# Patient Record
Sex: MALE | Race: WHITE | NOT HISPANIC OR LATINO | URBAN - METROPOLITAN AREA
[De-identification: names, ages, dates, MRNs, and addresses within clinical notes are randomized per-mention and may not be internally consistent; named-entity substitution may affect disease eponyms.]

---

## 2017-01-30 ENCOUNTER — INPATIENT (INPATIENT)
Facility: HOSPITAL | Age: 37
LOS: 6 days | Discharge: HOME | End: 2017-02-06
Attending: INTERNAL MEDICINE | Admitting: INTERNAL MEDICINE
Payer: COMMERCIAL

## 2017-01-30 VITALS
DIASTOLIC BLOOD PRESSURE: 72 MMHG | SYSTOLIC BLOOD PRESSURE: 109 MMHG | HEART RATE: 80 BPM | WEIGHT: 250 LBS | RESPIRATION RATE: 18 BRPM | OXYGEN SATURATION: 93 % | HEIGHT: 70 IN | TEMPERATURE: 98 F

## 2017-01-30 DIAGNOSIS — H59.022 CATARACT (LENS) FRAGMENTS IN EYE FOLLOWING CATARACT SURGERY, LEFT EYE: Chronic | ICD-10-CM

## 2017-01-30 DIAGNOSIS — I10 ESSENTIAL (PRIMARY) HYPERTENSION: ICD-10-CM

## 2017-01-30 DIAGNOSIS — N17.0 ACUTE KIDNEY FAILURE WITH TUBULAR NECROSIS: ICD-10-CM

## 2017-01-30 DIAGNOSIS — F10.10 ALCOHOL ABUSE, UNCOMPLICATED: ICD-10-CM

## 2017-01-30 DIAGNOSIS — M32.13 LUNG INVOLVEMENT IN SYSTEMIC LUPUS ERYTHEMATOSUS: ICD-10-CM

## 2017-01-30 DIAGNOSIS — S20.212A CONTUSION OF LEFT FRONT WALL OF THORAX, INITIAL ENCOUNTER: ICD-10-CM

## 2017-01-30 DIAGNOSIS — K57.92 DIVERTICULITIS OF INTESTINE, PART UNSPECIFIED, WITHOUT PERFORATION OR ABSCESS WITHOUT BLEEDING: ICD-10-CM

## 2017-01-30 DIAGNOSIS — R79.1 ABNORMAL COAGULATION PROFILE: ICD-10-CM

## 2017-01-30 DIAGNOSIS — Z72.0 TOBACCO USE: ICD-10-CM

## 2017-01-30 DIAGNOSIS — Z98.89 OTHER SPECIFIED POSTPROCEDURAL STATES: Chronic | ICD-10-CM

## 2017-01-30 DIAGNOSIS — I48.91 UNSPECIFIED ATRIAL FIBRILLATION: ICD-10-CM

## 2017-01-30 DIAGNOSIS — R55 SYNCOPE AND COLLAPSE: ICD-10-CM

## 2017-01-30 DIAGNOSIS — J44.9 CHRONIC OBSTRUCTIVE PULMONARY DISEASE, UNSPECIFIED: ICD-10-CM

## 2017-01-30 LAB
ALBUMIN SERPL ELPH-MCNC: 2.7 G/DL — LOW (ref 3.3–5)
ALP SERPL-CCNC: 100 U/L — SIGNIFICANT CHANGE UP (ref 40–120)
ALT FLD-CCNC: 58 U/L — SIGNIFICANT CHANGE UP (ref 12–78)
ANION GAP SERPL CALC-SCNC: 12 MMOL/L — SIGNIFICANT CHANGE UP (ref 5–17)
ANISOCYTOSIS BLD QL: SLIGHT — SIGNIFICANT CHANGE UP
APTT BLD: 50.2 SEC — HIGH (ref 27.5–37.4)
APTT BLD: 50.4 SEC — HIGH (ref 27.5–37.4)
AST SERPL-CCNC: 89 U/L — HIGH (ref 15–37)
BASOPHILS # BLD AUTO: 0 K/UL — SIGNIFICANT CHANGE UP (ref 0–0.2)
BASOPHILS NFR BLD AUTO: 0 % — SIGNIFICANT CHANGE UP (ref 0–2)
BILIRUB SERPL-MCNC: 0.6 MG/DL — SIGNIFICANT CHANGE UP (ref 0.2–1.2)
BUN SERPL-MCNC: 27 MG/DL — HIGH (ref 7–23)
CALCIUM SERPL-MCNC: 7.2 MG/DL — LOW (ref 8.5–10.1)
CHLORIDE SERPL-SCNC: 95 MMOL/L — LOW (ref 96–108)
CK MB BLD-MCNC: 0.6 % — SIGNIFICANT CHANGE UP (ref 0–3.5)
CK MB CFR SERPL CALC: 0.9 NG/ML — SIGNIFICANT CHANGE UP (ref 0.5–3.6)
CK SERPL-CCNC: 109 U/L — SIGNIFICANT CHANGE UP (ref 26–308)
CK SERPL-CCNC: 145 U/L — SIGNIFICANT CHANGE UP (ref 26–308)
CO2 SERPL-SCNC: 29 MMOL/L — SIGNIFICANT CHANGE UP (ref 22–31)
CREAT SERPL-MCNC: 2.08 MG/DL — HIGH (ref 0.5–1.3)
EOSINOPHIL # BLD AUTO: 0.1 K/UL — SIGNIFICANT CHANGE UP (ref 0–0.5)
EOSINOPHIL NFR BLD AUTO: 1 % — SIGNIFICANT CHANGE UP (ref 0–6)
GLUCOSE SERPL-MCNC: 82 MG/DL — SIGNIFICANT CHANGE UP (ref 70–99)
HCT VFR BLD CALC: 41 % — SIGNIFICANT CHANGE UP (ref 39–50)
HGB BLD-MCNC: 13.6 G/DL — SIGNIFICANT CHANGE UP (ref 13–17)
HYPOCHROMIA BLD QL: SLIGHT — SIGNIFICANT CHANGE UP
INR BLD: 1.04 RATIO — SIGNIFICANT CHANGE UP (ref 0.88–1.16)
LYMPHOCYTES # BLD AUTO: 0.6 K/UL — LOW (ref 1–3.3)
LYMPHOCYTES # BLD AUTO: 20 % — SIGNIFICANT CHANGE UP (ref 13–44)
MAGNESIUM SERPL-MCNC: 0.9 MG/DL — CRITICAL LOW (ref 1.8–2.4)
MCHC RBC-ENTMCNC: 27.5 PG — SIGNIFICANT CHANGE UP (ref 27–34)
MCHC RBC-ENTMCNC: 33.3 GM/DL — SIGNIFICANT CHANGE UP (ref 32–36)
MCV RBC AUTO: 82.6 FL — SIGNIFICANT CHANGE UP (ref 80–100)
MICROCYTES BLD QL: SLIGHT — SIGNIFICANT CHANGE UP
MONOCYTES # BLD AUTO: 0.6 K/UL — SIGNIFICANT CHANGE UP (ref 0–0.9)
MONOCYTES NFR BLD AUTO: 10 % — SIGNIFICANT CHANGE UP (ref 2–14)
NEUTROPHILS # BLD AUTO: 2 K/UL — SIGNIFICANT CHANGE UP (ref 1.8–7.4)
NEUTROPHILS NFR BLD AUTO: 69 % — SIGNIFICANT CHANGE UP (ref 43–77)
PHOSPHATE SERPL-MCNC: 4.3 MG/DL — SIGNIFICANT CHANGE UP (ref 2.5–4.5)
PLAT MORPH BLD: NORMAL — SIGNIFICANT CHANGE UP
PLATELET # BLD AUTO: 116 K/UL — LOW (ref 150–400)
POTASSIUM SERPL-MCNC: 3.2 MMOL/L — LOW (ref 3.5–5.3)
POTASSIUM SERPL-SCNC: 3.2 MMOL/L — LOW (ref 3.5–5.3)
PROT SERPL-MCNC: 7.5 GM/DL — SIGNIFICANT CHANGE UP (ref 6–8.3)
PROTHROM AB SERPL-ACNC: 11.6 SEC — SIGNIFICANT CHANGE UP (ref 10–13.1)
RBC # BLD: 4.96 M/UL — SIGNIFICANT CHANGE UP (ref 4.2–5.8)
RBC # FLD: 19.6 % — HIGH (ref 11–15)
RBC BLD AUTO: ABNORMAL
SODIUM SERPL-SCNC: 136 MMOL/L — SIGNIFICANT CHANGE UP (ref 135–145)
TROPONIN I SERPL-MCNC: 0.02 NG/ML — SIGNIFICANT CHANGE UP (ref 0.01–0.04)
TROPONIN I SERPL-MCNC: 0.03 NG/ML — SIGNIFICANT CHANGE UP (ref 0.01–0.04)
WBC # BLD: 3.3 K/UL — LOW (ref 3.8–10.5)
WBC # FLD AUTO: 3.3 K/UL — LOW (ref 3.8–10.5)

## 2017-01-30 PROCEDURE — 99223 1ST HOSP IP/OBS HIGH 75: CPT

## 2017-01-30 PROCEDURE — 99285 EMERGENCY DEPT VISIT HI MDM: CPT

## 2017-01-30 PROCEDURE — 70450 CT HEAD/BRAIN W/O DYE: CPT | Mod: 26

## 2017-01-30 PROCEDURE — 71250 CT THORAX DX C-: CPT | Mod: 26

## 2017-01-30 RX ORDER — TRAMADOL HYDROCHLORIDE 50 MG/1
50 TABLET ORAL EVERY 6 HOURS
Qty: 0 | Refills: 0 | Status: DISCONTINUED | OUTPATIENT
Start: 2017-01-30 | End: 2017-01-31

## 2017-01-30 RX ORDER — SODIUM CHLORIDE 9 MG/ML
3 INJECTION INTRAMUSCULAR; INTRAVENOUS; SUBCUTANEOUS EVERY 8 HOURS
Qty: 0 | Refills: 0 | Status: DISCONTINUED | OUTPATIENT
Start: 2017-01-30 | End: 2017-02-06

## 2017-01-30 RX ORDER — POTASSIUM CHLORIDE 20 MEQ
40 PACKET (EA) ORAL ONCE
Qty: 0 | Refills: 0 | Status: COMPLETED | OUTPATIENT
Start: 2017-01-30 | End: 2017-01-30

## 2017-01-30 RX ORDER — NICOTINE POLACRILEX 2 MG
1 GUM BUCCAL DAILY
Qty: 0 | Refills: 0 | Status: DISCONTINUED | OUTPATIENT
Start: 2017-01-30 | End: 2017-02-06

## 2017-01-30 RX ORDER — SODIUM CHLORIDE 9 MG/ML
3 INJECTION INTRAMUSCULAR; INTRAVENOUS; SUBCUTANEOUS ONCE
Qty: 0 | Refills: 0 | Status: COMPLETED | OUTPATIENT
Start: 2017-01-30 | End: 2017-01-30

## 2017-01-30 RX ORDER — MECLIZINE HCL 12.5 MG
25 TABLET ORAL ONCE
Qty: 0 | Refills: 0 | Status: DISCONTINUED | OUTPATIENT
Start: 2017-01-30 | End: 2017-01-30

## 2017-01-30 RX ORDER — SIMVASTATIN 20 MG/1
20 TABLET, FILM COATED ORAL AT BEDTIME
Qty: 0 | Refills: 0 | Status: DISCONTINUED | OUTPATIENT
Start: 2017-01-30 | End: 2017-02-06

## 2017-01-30 RX ORDER — MAGNESIUM OXIDE 400 MG ORAL TABLET 241.3 MG
400 TABLET ORAL DAILY
Qty: 0 | Refills: 0 | Status: DISCONTINUED | OUTPATIENT
Start: 2017-01-30 | End: 2017-02-06

## 2017-01-30 RX ORDER — CARVEDILOL PHOSPHATE 80 MG/1
25 CAPSULE, EXTENDED RELEASE ORAL EVERY 12 HOURS
Qty: 0 | Refills: 0 | Status: DISCONTINUED | OUTPATIENT
Start: 2017-01-30 | End: 2017-02-06

## 2017-01-30 RX ORDER — LISINOPRIL 2.5 MG/1
20 TABLET ORAL
Qty: 0 | Refills: 0 | Status: DISCONTINUED | OUTPATIENT
Start: 2017-01-30 | End: 2017-02-06

## 2017-01-30 RX ORDER — RAMIPRIL 5 MG
1 CAPSULE ORAL
Qty: 0 | Refills: 0 | COMMUNITY

## 2017-01-30 RX ORDER — AMLODIPINE BESYLATE 2.5 MG/1
5 TABLET ORAL
Qty: 0 | Refills: 0 | Status: DISCONTINUED | OUTPATIENT
Start: 2017-01-30 | End: 2017-02-06

## 2017-01-30 RX ORDER — SIMVASTATIN 20 MG/1
20 TABLET, FILM COATED ORAL AT BEDTIME
Qty: 0 | Refills: 0 | Status: DISCONTINUED | OUTPATIENT
Start: 2017-01-30 | End: 2017-01-30

## 2017-01-30 RX ORDER — SODIUM CHLORIDE 9 MG/ML
1000 INJECTION INTRAMUSCULAR; INTRAVENOUS; SUBCUTANEOUS
Qty: 0 | Refills: 0 | Status: DISCONTINUED | OUTPATIENT
Start: 2017-01-30 | End: 2017-02-02

## 2017-01-30 RX ORDER — HYDROXYCHLOROQUINE SULFATE 200 MG
200 TABLET ORAL
Qty: 0 | Refills: 0 | Status: DISCONTINUED | OUTPATIENT
Start: 2017-01-30 | End: 2017-02-06

## 2017-01-30 RX ORDER — MAGNESIUM SULFATE 500 MG/ML
2 VIAL (ML) INJECTION ONCE
Qty: 0 | Refills: 0 | Status: COMPLETED | OUTPATIENT
Start: 2017-01-30 | End: 2017-01-30

## 2017-01-30 RX ORDER — POTASSIUM CHLORIDE 20 MEQ
40 PACKET (EA) ORAL ONCE
Qty: 0 | Refills: 0 | Status: DISCONTINUED | OUTPATIENT
Start: 2017-01-30 | End: 2017-01-30

## 2017-01-30 RX ORDER — FERROUS SULFATE 325(65) MG
325 TABLET ORAL DAILY
Qty: 0 | Refills: 0 | Status: DISCONTINUED | OUTPATIENT
Start: 2017-01-30 | End: 2017-02-06

## 2017-01-30 RX ORDER — PANTOPRAZOLE SODIUM 20 MG/1
40 TABLET, DELAYED RELEASE ORAL
Qty: 0 | Refills: 0 | Status: DISCONTINUED | OUTPATIENT
Start: 2017-01-30 | End: 2017-02-06

## 2017-01-30 RX ORDER — POTASSIUM CHLORIDE 20 MEQ
20 PACKET (EA) ORAL ONCE
Qty: 0 | Refills: 0 | Status: COMPLETED | OUTPATIENT
Start: 2017-01-30 | End: 2017-01-30

## 2017-01-30 RX ORDER — TRAMADOL HYDROCHLORIDE 50 MG/1
50 TABLET ORAL ONCE
Qty: 0 | Refills: 0 | Status: DISCONTINUED | OUTPATIENT
Start: 2017-01-30 | End: 2017-01-30

## 2017-01-30 RX ADMIN — TRAMADOL HYDROCHLORIDE 50 MILLIGRAM(S): 50 TABLET ORAL at 23:16

## 2017-01-30 RX ADMIN — Medication 40 MILLIEQUIVALENT(S): at 19:20

## 2017-01-30 RX ADMIN — TRAMADOL HYDROCHLORIDE 50 MILLIGRAM(S): 50 TABLET ORAL at 23:30

## 2017-01-30 RX ADMIN — Medication 50 GRAM(S): at 21:48

## 2017-01-30 RX ADMIN — AMLODIPINE BESYLATE 5 MILLIGRAM(S): 2.5 TABLET ORAL at 22:07

## 2017-01-30 RX ADMIN — PANTOPRAZOLE SODIUM 40 MILLIGRAM(S): 20 TABLET, DELAYED RELEASE ORAL at 22:06

## 2017-01-30 RX ADMIN — Medication 20 MILLIEQUIVALENT(S): at 19:19

## 2017-01-30 RX ADMIN — SODIUM CHLORIDE 3 MILLILITER(S): 9 INJECTION INTRAMUSCULAR; INTRAVENOUS; SUBCUTANEOUS at 13:02

## 2017-01-30 RX ADMIN — Medication 2 MILLIGRAM(S): at 21:47

## 2017-01-30 RX ADMIN — SODIUM CHLORIDE 75 MILLILITER(S): 9 INJECTION INTRAMUSCULAR; INTRAVENOUS; SUBCUTANEOUS at 21:38

## 2017-01-30 RX ADMIN — CARVEDILOL PHOSPHATE 25 MILLIGRAM(S): 80 CAPSULE, EXTENDED RELEASE ORAL at 22:04

## 2017-01-30 RX ADMIN — Medication 200 MILLIGRAM(S): at 19:19

## 2017-01-30 RX ADMIN — TRAMADOL HYDROCHLORIDE 50 MILLIGRAM(S): 50 TABLET ORAL at 14:10

## 2017-01-30 RX ADMIN — SODIUM CHLORIDE 3 MILLILITER(S): 9 INJECTION INTRAMUSCULAR; INTRAVENOUS; SUBCUTANEOUS at 22:06

## 2017-01-30 RX ADMIN — SIMVASTATIN 20 MILLIGRAM(S): 20 TABLET, FILM COATED ORAL at 22:06

## 2017-01-30 RX ADMIN — TRAMADOL HYDROCHLORIDE 50 MILLIGRAM(S): 50 TABLET ORAL at 13:52

## 2017-01-30 NOTE — ED PROVIDER NOTE - PSH
Cataract (lens) fragments in eye following cataract surgery, left eye    History of throat surgery  stretched  History of throat surgery  cyst removed

## 2017-01-30 NOTE — H&P ADULT. - PROBLEM SELECTOR PLAN 9
contiue acei coreg and norvasc  low salt diet repeat labs  no lovenox for dvt ppx will use venodyne boots  pt is on ppi from home will continue

## 2017-01-30 NOTE — H&P ADULT. - PROBLEM SELECTOR PLAN 10
repeat labs  no lovenox for dvt ppx will use venodyne boots  pt is on ppi from home will continue creatinine > 2 previous <1   will rehydrate for etoh abuse and dehydration component monitor bun /cr consider renal eval /ultrasound kidney if no improvement

## 2017-01-30 NOTE — H&P ADULT. - PROBLEM SELECTOR PROBLEM 8
Diverticulitis of intestine without perforation or abscess without bleeding, unspecified part of intestinal tract Essential hypertension

## 2017-01-30 NOTE — H&P ADULT. - ASSESSMENT
25 yo  male etoh abuse has been drinking 1 quart vodka daily except for yesterday when he had 1 pint got oob to go to bathroom and was later found by his wife when she came  home from work lying on back with incontinence of stool no incontinence of urine . event was not witnessed and pt only remembers getting up . he experienced pain to left rib cage upon being awakened no pain to arms legs backside. no pmh of similar event ekg in er sinus rhythm slight ivcd qrs 10.5 otc ok . pt noted with cough white sputum , pt noted l flank pain upon awakening and has large hematoma to l flank ct scan shown no traumatic injury but pt has c/o pleuritic chest pain dyspnea

## 2017-01-30 NOTE — CONSULT NOTE ADULT - ASSESSMENT
awake alert speech fluent hx of etoh  copd gem  weakness legs ct head no acute  path    for mri brain tsh b12 eeg rpr  arm leg 4/5 r/o etoh with drawl seziure no tremors .

## 2017-01-30 NOTE — ED PROVIDER NOTE - PMH
A-fib    Diverticulitis    Enlarged heart    Heart valve problem  leaky  Kidney problem  born with 1 kidney, right side  Lupus    Platelets decreased

## 2017-01-30 NOTE — ED PROVIDER NOTE - CARE PLAN
Principal Discharge DX:	Syncope and collapse  Secondary Diagnosis:	Contusion of chest, left, initial encounter

## 2017-01-30 NOTE — ED ADULT TRIAGE NOTE - CHIEF COMPLAINT QUOTE
syncopal episode with c/o left rib s/p fall with pain upon inspiration , wife found patient on floor @ 6am

## 2017-01-30 NOTE — H&P ADULT. - HISTORY OF PRESENT ILLNESS
27 yo  male etoh abuse has been drinking 1 quart vodka daily except for yesterday when he had 1 pint got oob to go to bathroom and was later found by his wife when she came  home from work lying on back with incontinence of stool no incontinence of urine . event was not witnessed and pt only remembers getting up . he experienced pain to left rib cage upon being awakened no pain to arms legs backside. no pmh of similar event ekg in er sinus rhythm slight ivcd qrs 10.5 otc ok . pt noted with cough white sputum , pt noted l flank pain upon awakening and has large hematoma to l flank ct scan shown no traumatic injury but pt has c/o pleuritic chest pain dyspnea  pmh pf copd active smoker 1 ppd , htn, lupus , born with 1 kidney ,thrombocytopenia , afib  now in sinus, enlarged heart, diverticulosis , psh cataracts throat cystectomy , esophageal stricture dilation x 3 episodes   labs significant got ptt of 50 27 yo  male etoh abuse has been drinking 1 quart vodka daily except for yesterday when he had 1 pint got oob to go to bathroom and was later found by his wife when she came  home from work lying on back with incontinence of stool no incontinence of urine . event was not witnessed and pt only remembers getting up . he experienced pain to left rib cage upon being awakened no pain to arms legs backside. no pmh of similar event ekg in er sinus rhythm slight ivcd qrs 10.5 otc ok . pt noted with cough white sputum , pt noted l flank pain upon awakening and has large hematoma to l flank ct scan shown no traumatic injury but pt has c/o pleuritic chest pain dyspnea  pmh pf copd active smoker 1 ppd , htn, lupus , born with 1 kidney ,thrombocytopenia , afib  now in sinus, enlarged heart, diverticulosis , psh cataracts throat cystectomy , esophageal stricture dilation x 3 episodes   labs significant got ptt of 50 , k+ 3.2

## 2017-01-30 NOTE — H&P ADULT. - PROBLEM SELECTOR PLAN 8
not active issue contiue acei coreg and norvasc  low salt diet contiue acei coreg and norvasc  low salt diet  low potasium replete and monitor

## 2017-01-30 NOTE — H&P ADULT. - PROBLEM SELECTOR PLAN 1
telemetry monitoring  cardiology consult  tte  serial c/e  concerns for seizure possible from etho withdrawal dr arredondo consulted

## 2017-01-30 NOTE — H&P ADULT. - PMH
A-fib    Chronic obstructive pulmonary disease, unspecified COPD type    Diverticulitis    Enlarged heart    Heart valve problem  leaky  Kidney problem  born with 1 kidney, right side  Lupus    Platelets decreased    Tobacco abuse

## 2017-01-30 NOTE — ED ADULT NURSE NOTE - OBJECTIVE STATEMENT
syncopal episode with c/o left rib s/p fall with pain upon inspiration , wife found patient on floor @ 6am. He hit his left side on the dresser. He having pain with breathing and movement . He stopped the Coumadin  2 weeks ago taking for Atrial Fibrillation. He has a bruise to left side 8cm x 4cm with tenderness. Pt last alcohol drink 1 hour ago

## 2017-01-30 NOTE — H&P ADULT. - FAMILY HISTORY
Mother  Still living? Yes, Estimated age: Age Unknown  Family history of diabetes mellitus, Age at diagnosis: Age Unknown  Family history of sleep apnea, Age at diagnosis: Age Unknown

## 2017-01-31 DIAGNOSIS — I48.0 PAROXYSMAL ATRIAL FIBRILLATION: ICD-10-CM

## 2017-01-31 DIAGNOSIS — F10.230 ALCOHOL DEPENDENCE WITH WITHDRAWAL, UNCOMPLICATED: ICD-10-CM

## 2017-01-31 DIAGNOSIS — M32.8 OTHER FORMS OF SYSTEMIC LUPUS ERYTHEMATOSUS: ICD-10-CM

## 2017-01-31 DIAGNOSIS — E87.6 HYPOKALEMIA: ICD-10-CM

## 2017-01-31 DIAGNOSIS — E83.42 HYPOMAGNESEMIA: ICD-10-CM

## 2017-01-31 LAB
ANION GAP SERPL CALC-SCNC: 11 MMOL/L — SIGNIFICANT CHANGE UP (ref 5–17)
APTT BLD: 52 SEC — HIGH (ref 27.5–37.4)
BUN SERPL-MCNC: 29 MG/DL — HIGH (ref 7–23)
CALCIUM SERPL-MCNC: 7.4 MG/DL — LOW (ref 8.5–10.1)
CHLORIDE SERPL-SCNC: 94 MMOL/L — LOW (ref 96–108)
CHOLEST SERPL-MCNC: 134 MG/DL — SIGNIFICANT CHANGE UP (ref 10–199)
CK SERPL-CCNC: 98 U/L — SIGNIFICANT CHANGE UP (ref 26–308)
CO2 SERPL-SCNC: 30 MMOL/L — SIGNIFICANT CHANGE UP (ref 22–31)
CREAT SERPL-MCNC: 1.37 MG/DL — HIGH (ref 0.5–1.3)
GLUCOSE SERPL-MCNC: 79 MG/DL — SIGNIFICANT CHANGE UP (ref 70–99)
HCT VFR BLD CALC: 37.1 % — LOW (ref 39–50)
HCV AB S/CO SERPL IA: 0.17 S/CO — SIGNIFICANT CHANGE UP
HCV AB SERPL-IMP: SIGNIFICANT CHANGE UP
HDLC SERPL-MCNC: 70 MG/DL — SIGNIFICANT CHANGE UP (ref 40–125)
HGB BLD-MCNC: 12.3 G/DL — LOW (ref 13–17)
HIV 1+2 AB+HIV1 P24 AG SERPL QL IA: SIGNIFICANT CHANGE UP
LIPID PNL WITH DIRECT LDL SERPL: 50 MG/DL — SIGNIFICANT CHANGE UP
MAGNESIUM SERPL-MCNC: 1.3 MG/DL — LOW (ref 1.8–2.4)
MCHC RBC-ENTMCNC: 27.3 PG — SIGNIFICANT CHANGE UP (ref 27–34)
MCHC RBC-ENTMCNC: 33.1 GM/DL — SIGNIFICANT CHANGE UP (ref 32–36)
MCV RBC AUTO: 82.4 FL — SIGNIFICANT CHANGE UP (ref 80–100)
PLATELET # BLD AUTO: 109 K/UL — LOW (ref 150–400)
POTASSIUM SERPL-MCNC: 3.2 MMOL/L — LOW (ref 3.5–5.3)
POTASSIUM SERPL-SCNC: 3.2 MMOL/L — LOW (ref 3.5–5.3)
RBC # BLD: 4.5 M/UL — SIGNIFICANT CHANGE UP (ref 4.2–5.8)
RBC # FLD: 19.2 % — HIGH (ref 11–15)
SODIUM SERPL-SCNC: 135 MMOL/L — SIGNIFICANT CHANGE UP (ref 135–145)
TOTAL CHOLESTEROL/HDL RATIO MEASUREMENT: 1.9 RATIO — LOW (ref 3.4–9.6)
TRIGL SERPL-MCNC: 69 MG/DL — SIGNIFICANT CHANGE UP (ref 10–149)
TROPONIN I SERPL-MCNC: 0.02 NG/ML — SIGNIFICANT CHANGE UP (ref 0.01–0.04)
WBC # BLD: 3.4 K/UL — LOW (ref 3.8–10.5)
WBC # FLD AUTO: 3.4 K/UL — LOW (ref 3.8–10.5)

## 2017-01-31 PROCEDURE — 99233 SBSQ HOSP IP/OBS HIGH 50: CPT

## 2017-01-31 RX ORDER — POTASSIUM CHLORIDE 20 MEQ
20 PACKET (EA) ORAL
Qty: 0 | Refills: 0 | Status: COMPLETED | OUTPATIENT
Start: 2017-01-31 | End: 2017-01-31

## 2017-01-31 RX ORDER — MAGNESIUM SULFATE 500 MG/ML
2 VIAL (ML) INJECTION ONCE
Qty: 0 | Refills: 0 | Status: COMPLETED | OUTPATIENT
Start: 2017-01-31 | End: 2017-01-31

## 2017-01-31 RX ORDER — IPRATROPIUM/ALBUTEROL SULFATE 18-103MCG
3 AEROSOL WITH ADAPTER (GRAM) INHALATION ONCE
Qty: 0 | Refills: 0 | Status: COMPLETED | OUTPATIENT
Start: 2017-01-31 | End: 2017-01-31

## 2017-01-31 RX ORDER — ALBUTEROL 90 UG/1
2 AEROSOL, METERED ORAL EVERY 6 HOURS
Qty: 0 | Refills: 0 | Status: DISCONTINUED | OUTPATIENT
Start: 2017-01-31 | End: 2017-02-06

## 2017-01-31 RX ORDER — NICOTINE POLACRILEX 2 MG
4 GUM BUCCAL EVERY 4 HOURS
Qty: 0 | Refills: 0 | Status: DISCONTINUED | OUTPATIENT
Start: 2017-01-31 | End: 2017-02-06

## 2017-01-31 RX ADMIN — Medication 1.5 MILLIGRAM(S): at 18:14

## 2017-01-31 RX ADMIN — TRAMADOL HYDROCHLORIDE 50 MILLIGRAM(S): 50 TABLET ORAL at 23:34

## 2017-01-31 RX ADMIN — Medication 2 MILLIGRAM(S): at 21:19

## 2017-01-31 RX ADMIN — SODIUM CHLORIDE 3 MILLILITER(S): 9 INJECTION INTRAMUSCULAR; INTRAVENOUS; SUBCUTANEOUS at 05:51

## 2017-01-31 RX ADMIN — Medication 1 PATCH: at 12:45

## 2017-01-31 RX ADMIN — Medication 200 MILLIGRAM(S): at 18:13

## 2017-01-31 RX ADMIN — Medication 2 MILLIGRAM(S): at 14:41

## 2017-01-31 RX ADMIN — Medication 2 MILLIGRAM(S): at 02:59

## 2017-01-31 RX ADMIN — CARVEDILOL PHOSPHATE 25 MILLIGRAM(S): 80 CAPSULE, EXTENDED RELEASE ORAL at 05:50

## 2017-01-31 RX ADMIN — TRAMADOL HYDROCHLORIDE 50 MILLIGRAM(S): 50 TABLET ORAL at 13:39

## 2017-01-31 RX ADMIN — Medication 3 MILLILITER(S): at 05:50

## 2017-01-31 RX ADMIN — Medication 2 MILLIGRAM(S): at 11:16

## 2017-01-31 RX ADMIN — Medication 2 MILLIGRAM(S): at 19:16

## 2017-01-31 RX ADMIN — Medication 20 MILLIEQUIVALENT(S): at 12:46

## 2017-01-31 RX ADMIN — Medication 4 MILLIGRAM(S): at 20:05

## 2017-01-31 RX ADMIN — CARVEDILOL PHOSPHATE 25 MILLIGRAM(S): 80 CAPSULE, EXTENDED RELEASE ORAL at 18:13

## 2017-01-31 RX ADMIN — LISINOPRIL 20 MILLIGRAM(S): 2.5 TABLET ORAL at 05:50

## 2017-01-31 RX ADMIN — Medication 2 MILLIGRAM(S): at 16:03

## 2017-01-31 RX ADMIN — Medication 50 GRAM(S): at 13:31

## 2017-01-31 RX ADMIN — Medication 2 MILLIGRAM(S): at 20:10

## 2017-01-31 RX ADMIN — TRAMADOL HYDROCHLORIDE 50 MILLIGRAM(S): 50 TABLET ORAL at 22:34

## 2017-01-31 RX ADMIN — Medication 20 MILLIEQUIVALENT(S): at 14:41

## 2017-01-31 RX ADMIN — Medication 2 MILLIGRAM(S): at 05:49

## 2017-01-31 RX ADMIN — ALBUTEROL 2 PUFF(S): 90 AEROSOL, METERED ORAL at 12:46

## 2017-01-31 RX ADMIN — Medication 1.5 MILLIGRAM(S): at 22:27

## 2017-01-31 RX ADMIN — SODIUM CHLORIDE 75 MILLILITER(S): 9 INJECTION INTRAMUSCULAR; INTRAVENOUS; SUBCUTANEOUS at 22:15

## 2017-01-31 RX ADMIN — Medication 325 MILLIGRAM(S): at 13:45

## 2017-01-31 RX ADMIN — SIMVASTATIN 20 MILLIGRAM(S): 20 TABLET, FILM COATED ORAL at 22:14

## 2017-01-31 RX ADMIN — TRAMADOL HYDROCHLORIDE 50 MILLIGRAM(S): 50 TABLET ORAL at 14:40

## 2017-01-31 RX ADMIN — SODIUM CHLORIDE 3 MILLILITER(S): 9 INJECTION INTRAMUSCULAR; INTRAVENOUS; SUBCUTANEOUS at 21:42

## 2017-01-31 RX ADMIN — SODIUM CHLORIDE 3 MILLILITER(S): 9 INJECTION INTRAMUSCULAR; INTRAVENOUS; SUBCUTANEOUS at 14:41

## 2017-01-31 RX ADMIN — PANTOPRAZOLE SODIUM 40 MILLIGRAM(S): 20 TABLET, DELAYED RELEASE ORAL at 08:46

## 2017-01-31 RX ADMIN — Medication 200 MILLIGRAM(S): at 08:46

## 2017-01-31 RX ADMIN — Medication 20 MILLIEQUIVALENT(S): at 18:13

## 2017-01-31 RX ADMIN — AMLODIPINE BESYLATE 5 MILLIGRAM(S): 2.5 TABLET ORAL at 05:49

## 2017-01-31 NOTE — PROGRESS NOTE ADULT - SUBJECTIVE AND OBJECTIVE BOX
Patient is a 36y old  Male who presents with a chief complaint of passed out found on floor by wife (30 Jan 2017 17:30)      INTERVAL HPI/OVERNIGHT EVENTS:    MEDICATIONS  (STANDING):  sodium chloride 0.9% lock flush 3milliLiter(s) IV Push every 8 hours  LORazepam     Tablet 2milliGRAM(s) Oral every 4 hours  LORazepam     Tablet 1.5milliGRAM(s) Oral every 4 hours  sodium chloride 0.9%. 1000milliLiter(s) IV Continuous <Continuous>  LORazepam     Tablet milliGRAM(s) Oral   simvastatin 20milliGRAM(s) Oral at bedtime  hydroxychloroquine 200milliGRAM(s) Oral two times a day with meals  carvedilol 25milliGRAM(s) Oral every 12 hours  amLODIPine   Tablet 5milliGRAM(s) Oral two times a day  pantoprazole    Tablet 40milliGRAM(s) Oral before breakfast  lisinopril 20milliGRAM(s) Oral two times a day  ferrous    sulfate 325milliGRAM(s) Oral daily  magnesium oxide 400milliGRAM(s) Oral daily  nicotine -  14 mG/24Hr(s) Patch 1patch Transdermal daily  magnesium sulfate  IVPB 2Gram(s) IV Intermittent once  potassium chloride    Tablet ER 20milliEquivalent(s) Oral every 2 hours    MEDICATIONS  (PRN):  traMADol 50milliGRAM(s) Oral every 6 hours PRN Mild Pain (1 - 3)  ALBUTerol    90 MICROgram(s) HFA Inhaler 2Puff(s) Inhalation every 6 hours PRN Shortness of Breath and/or Wheezing      Allergies    sulfa drugs (Unknown)    Intolerances        REVIEW OF SYSTEMS:  CONSTITUTIONAL: No fever, weight loss, or fatigue  EYES: No eye pain, visual disturbances, or discharge  ENMT:  No difficulty hearing, tinnitus, vertigo; No sinus or throat pain  NECK: No pain or stiffness  BREASTS: No pain, masses, or nipple discharge  RESPIRATORY: No cough, wheezing, chills or hemoptysis; No shortness of breath  CARDIOVASCULAR: No chest pain, palpitations, dizziness, or leg swelling  GASTROINTESTINAL: No abdominal or epigastric pain. No nausea, vomiting, or hematemesis; No diarrhea or constipation. No melena or hematochezia.  GENITOURINARY: No dysuria, frequency, hematuria, or incontinence  NEUROLOGICAL: No headaches, memory loss, loss of strength, numbness, or tremors  SKIN: No itching, burning, rashes, or lesions   LYMPH NODES: No enlarged glands  ENDOCRINE: No heat or cold intolerance; No hair loss  MUSCULOSKELETAL: No joint pain or swelling; No muscle, back, or extremity pain  PSYCHIATRIC: No depression, anxiety, mood swings, or difficulty sleeping  HEME/LYMPH: No easy bruising, or bleeding gums  ALLERGY AND IMMUNOLOGIC: No hives or eczema    Vital Signs Last 24 Hrs  T(C): 36.9, Max: 36.9 (01-31 @ 05:28)  T(F): 98.4, Max: 98.4 (01-31 @ 05:28)  HR: 97 (80 - 105)  BP: 130/82 (107/61 - 151/91)  BP(mean): --  RR: 18 (16 - 20)  SpO2: 95% (92% - 97%)    PHYSICAL EXAM:  GENERAL: NAD, well-groomed, well-developed  HEAD:  Atraumatic, Normocephalic  EYES: EOMI, PERRLA, conjunctiva and sclera clear  ENMT: No tonsillar erythema, exudates, or enlargement; Moist mucous membranes, Good dentition, No lesions  NECK: Supple, No JVD, Normal thyroid  NERVOUS SYSTEM:  Alert & Oriented X3, Good concentration; Motor Strength 5/5 B/L upper and lower extremities; DTRs 2+ intact and symmetric  CHEST/LUNG: Clear to percussion bilaterally; No rales, rhonchi, wheezing, or rubs  HEART: Regular rate and rhythm; No murmurs, rubs, or gallops  ABDOMEN: Soft, Nontender, Nondistended; Bowel sounds present  EXTREMITIES:  2+ Peripheral Pulses, No clubbing, cyanosis, or edema  LYMPH: No lymphadenopathy noted  SKIN: No rashes or lesions    LABS:                        12.3   3.4   )-----------( 109      ( 31 Jan 2017 07:19 )             37.1     31 Jan 2017 07:19    135    |  94     |  29     ----------------------------<  79     3.2     |  30     |  1.37     Ca    7.4        31 Jan 2017 07:19  Phos  4.3       30 Jan 2017 18:29  Mg     1.3       31 Jan 2017 07:19    TPro  7.5    /  Alb  2.7    /  TBili  0.6    /  DBili  x      /  AST  89     /  ALT  58     /  AlkPhos  100    30 Jan 2017 13:13    PT/INR - ( 30 Jan 2017 13:13 )   PT: 11.6 sec;   INR: 1.04 ratio         PTT - ( 31 Jan 2017 07:19 )  PTT:52.0 sec    CAPILLARY BLOOD GLUCOSE      RADIOLOGY & ADDITIONAL TESTS:    Imaging Personally Reviewed:  [ ] YES  [ ] NO    Consultant(s) Notes Reviewed:  [ ] YES  [ ] NO    Care Discussed with Consultants/Other Providers [ ] YES  [ ] NO Patient is a 36y old  Male who presents with a chief complaint of passed out found on floor by wife (30 Jan 2017 17:30)      INTERVAL HPI/OVERNIGHT EVENTS:  notes right sided/abd back pain.  Feels slightly anxious.  some nausea.  no vomiting.  no fever or chills.  Mild tremor.  Wife at bedside.  patient states that he has tried to stop drinking in the past, the longest period of sobriety is 45 days last (september 2016).    MEDICATIONS  (STANDING):  sodium chloride 0.9% lock flush 3milliLiter(s) IV Push every 8 hours  LORazepam     Tablet 2milliGRAM(s) Oral every 4 hours  LORazepam     Tablet 1.5milliGRAM(s) Oral every 4 hours  sodium chloride 0.9%. 1000milliLiter(s) IV Continuous <Continuous>  LORazepam     Tablet milliGRAM(s) Oral   simvastatin 20milliGRAM(s) Oral at bedtime  hydroxychloroquine 200milliGRAM(s) Oral two times a day with meals  carvedilol 25milliGRAM(s) Oral every 12 hours  amLODIPine   Tablet 5milliGRAM(s) Oral two times a day  pantoprazole    Tablet 40milliGRAM(s) Oral before breakfast  lisinopril 20milliGRAM(s) Oral two times a day  ferrous    sulfate 325milliGRAM(s) Oral daily  magnesium oxide 400milliGRAM(s) Oral daily  nicotine -  14 mG/24Hr(s) Patch 1patch Transdermal daily  magnesium sulfate  IVPB 2Gram(s) IV Intermittent once  potassium chloride    Tablet ER 20milliEquivalent(s) Oral every 2 hours    MEDICATIONS  (PRN):  traMADol 50milliGRAM(s) Oral every 6 hours PRN Mild Pain (1 - 3)  ALBUTerol    90 MICROgram(s) HFA Inhaler 2Puff(s) Inhalation every 6 hours PRN Shortness of Breath and/or Wheezing      Allergies    sulfa drugs (Unknown)    Intolerances        REVIEW OF SYSTEMS:  CONSTITUTIONAL: No fever, weight loss, or fatigue  EYES: No eye pain, visual disturbances, or discharge  ENMT:  No difficulty hearing, tinnitus, vertigo; No sinus or throat pain  NECK: No pain or stiffness  BREASTS: No pain, masses, or nipple discharge  RESPIRATORY: No cough, wheezing, chills or hemoptysis; No shortness of breath  CARDIOVASCULAR: No chest pain, palpitations, dizziness, or leg swelling  GASTROINTESTINAL: No abdominal or epigastric pain. + nausea no vomiting or hematemesis; No diarrhea or constipation. No melena or hematochezia.  GENITOURINARY: No dysuria, frequency, hematuria, or incontinence  NEUROLOGICAL: No headaches, memory loss, loss of strength, numbness,+ mild tremors  SKIN: No itching, burning, rashes, or lesions   LYMPH NODES: No enlarged glands  ENDOCRINE: No heat or cold intolerance; No hair loss  MUSCULOSKELETAL: No joint pain or swelling; + muscle and back pain  PSYCHIATRIC: some anxiety and insomnia  HEME/LYMPH: No easy bruising, or bleeding gums  ALLERGY AND IMMUNOLOGIC: No hives or eczema    Vital Signs Last 24 Hrs  T(C): 36.9, Max: 36.9 (01-31 @ 05:28)  T(F): 98.4, Max: 98.4 (01-31 @ 05:28)  HR: 97 (80 - 105)  BP: 130/82 (107/61 - 151/91)  BP(mean): --  RR: 18 (16 - 20)  SpO2: 95% (92% - 97%)    PHYSICAL EXAM:  GENERAL: NAD, obese  HEAD:  Atraumatic, Normocephalic  EYES: EOMI, PERRLA, conjunctiva and sclera clear  ENMT: No tonsillar erythema, exudates, or enlargement; Moist mucous membranes, Good dentition, No lesions  NECK: Supple, No JVD  NERVOUS SYSTEM:  Alert & Oriented X3, Good concentration; Motor Strength 5/5 B/L upper and lower extremities; DTRs 2+ intact and symmetric.  Mild resting tremor bilaterally  CHEST/LUNG: Clear to auscultation bilaterally; No rales, rhonchi, wheezing, or rubs  HEART: Regular rate and rhythm; No murmurs, rubs, or gallops  ABDOMEN: Soft, obese nontender, Nondistended; Bowel sounds present  EXTREMITIES:  2+ Peripheral Pulses, No clubbing, cyanosis, or edema  LYMPH: No lymphadenopathy noted  SKIN: No rashes or lesions    LABS:                        12.3   3.4   )-----------( 109      ( 31 Jan 2017 07:19 )             37.1     31 Jan 2017 07:19    135    |  94     |  29     ----------------------------<  79     3.2     |  30     |  1.37     Ca    7.4        31 Jan 2017 07:19  Phos  4.3       30 Jan 2017 18:29  Mg     1.3       31 Jan 2017 07:19    TPro  7.5    /  Alb  2.7    /  TBili  0.6    /  DBili  x      /  AST  89     /  ALT  58     /  AlkPhos  100    30 Jan 2017 13:13    PT/INR - ( 30 Jan 2017 13:13 )   PT: 11.6 sec;   INR: 1.04 ratio         PTT - ( 31 Jan 2017 07:19 )  PTT:52.0 sec    CAPILLARY BLOOD GLUCOSE      RADIOLOGY & ADDITIONAL TESTS:    Imaging Personally Reviewed:  [x ] YES  [ ] NO    Consultant(s) Notes Reviewed:  [x ] YES  [ ] NO    Care Discussed with Consultants/Other Providers [x ] YES  [ ] NO

## 2017-02-01 LAB
ANION GAP SERPL CALC-SCNC: 6 MMOL/L — SIGNIFICANT CHANGE UP (ref 5–17)
BASOPHILS # BLD AUTO: 0 K/UL — SIGNIFICANT CHANGE UP (ref 0–0.2)
BASOPHILS NFR BLD AUTO: 0.8 % — SIGNIFICANT CHANGE UP (ref 0–2)
BUN SERPL-MCNC: 23 MG/DL — SIGNIFICANT CHANGE UP (ref 7–23)
CALCIUM SERPL-MCNC: 7.6 MG/DL — LOW (ref 8.5–10.1)
CHLORIDE SERPL-SCNC: 99 MMOL/L — SIGNIFICANT CHANGE UP (ref 96–108)
CO2 SERPL-SCNC: 33 MMOL/L — HIGH (ref 22–31)
CREAT SERPL-MCNC: 1.13 MG/DL — SIGNIFICANT CHANGE UP (ref 0.5–1.3)
EOSINOPHIL # BLD AUTO: 0.1 K/UL — SIGNIFICANT CHANGE UP (ref 0–0.5)
EOSINOPHIL NFR BLD AUTO: 2.9 % — SIGNIFICANT CHANGE UP (ref 0–6)
GLUCOSE SERPL-MCNC: 84 MG/DL — SIGNIFICANT CHANGE UP (ref 70–99)
HCT VFR BLD CALC: 32.7 % — LOW (ref 39–50)
HGB BLD-MCNC: 10.7 G/DL — LOW (ref 13–17)
LYMPHOCYTES # BLD AUTO: 0.5 K/UL — LOW (ref 1–3.3)
LYMPHOCYTES # BLD AUTO: 17.2 % — SIGNIFICANT CHANGE UP (ref 13–44)
MAGNESIUM SERPL-MCNC: 1.5 MG/DL — LOW (ref 1.8–2.4)
MCHC RBC-ENTMCNC: 27 PG — SIGNIFICANT CHANGE UP (ref 27–34)
MCHC RBC-ENTMCNC: 32.8 GM/DL — SIGNIFICANT CHANGE UP (ref 32–36)
MCV RBC AUTO: 82.2 FL — SIGNIFICANT CHANGE UP (ref 80–100)
MONOCYTES # BLD AUTO: 0.6 K/UL — SIGNIFICANT CHANGE UP (ref 0–0.9)
MONOCYTES NFR BLD AUTO: 22.9 % — HIGH (ref 2–14)
NEUTROPHILS # BLD AUTO: 1.5 K/UL — LOW (ref 1.8–7.4)
NEUTROPHILS NFR BLD AUTO: 56.1 % — SIGNIFICANT CHANGE UP (ref 43–77)
PHOSPHATE SERPL-MCNC: 2.6 MG/DL — SIGNIFICANT CHANGE UP (ref 2.5–4.5)
PLATELET # BLD AUTO: 104 K/UL — LOW (ref 150–400)
POTASSIUM SERPL-MCNC: 3.1 MMOL/L — LOW (ref 3.5–5.3)
POTASSIUM SERPL-SCNC: 3.1 MMOL/L — LOW (ref 3.5–5.3)
RBC # BLD: 3.97 M/UL — LOW (ref 4.2–5.8)
RBC # FLD: 19.1 % — HIGH (ref 11–15)
SODIUM SERPL-SCNC: 138 MMOL/L — SIGNIFICANT CHANGE UP (ref 135–145)
WBC # BLD: 2.6 K/UL — LOW (ref 3.8–10.5)
WBC # FLD AUTO: 2.6 K/UL — LOW (ref 3.8–10.5)

## 2017-02-01 PROCEDURE — 99233 SBSQ HOSP IP/OBS HIGH 50: CPT

## 2017-02-01 RX ORDER — POTASSIUM CHLORIDE 20 MEQ
40 PACKET (EA) ORAL EVERY 4 HOURS
Qty: 0 | Refills: 0 | Status: COMPLETED | OUTPATIENT
Start: 2017-02-01 | End: 2017-02-01

## 2017-02-01 RX ADMIN — Medication 1 PATCH: at 12:09

## 2017-02-01 RX ADMIN — Medication 2 MILLIGRAM(S): at 17:05

## 2017-02-01 RX ADMIN — Medication 1 MILLIGRAM(S): at 22:49

## 2017-02-01 RX ADMIN — Medication 2 MILLIGRAM(S): at 20:40

## 2017-02-01 RX ADMIN — CARVEDILOL PHOSPHATE 25 MILLIGRAM(S): 80 CAPSULE, EXTENDED RELEASE ORAL at 17:05

## 2017-02-01 RX ADMIN — SODIUM CHLORIDE 3 MILLILITER(S): 9 INJECTION INTRAMUSCULAR; INTRAVENOUS; SUBCUTANEOUS at 06:31

## 2017-02-01 RX ADMIN — MAGNESIUM OXIDE 400 MG ORAL TABLET 400 MILLIGRAM(S): 241.3 TABLET ORAL at 12:09

## 2017-02-01 RX ADMIN — SODIUM CHLORIDE 3 MILLILITER(S): 9 INJECTION INTRAMUSCULAR; INTRAVENOUS; SUBCUTANEOUS at 13:20

## 2017-02-01 RX ADMIN — Medication 2 MILLIGRAM(S): at 21:41

## 2017-02-01 RX ADMIN — Medication 2 MILLIGRAM(S): at 07:25

## 2017-02-01 RX ADMIN — Medication 1.5 MILLIGRAM(S): at 06:56

## 2017-02-01 RX ADMIN — Medication 2 MILLIGRAM(S): at 14:47

## 2017-02-01 RX ADMIN — PANTOPRAZOLE SODIUM 40 MILLIGRAM(S): 20 TABLET, DELAYED RELEASE ORAL at 06:57

## 2017-02-01 RX ADMIN — Medication 2 MILLIGRAM(S): at 19:00

## 2017-02-01 RX ADMIN — Medication 2 MILLIGRAM(S): at 03:18

## 2017-02-01 RX ADMIN — ALBUTEROL 2 PUFF(S): 90 AEROSOL, METERED ORAL at 17:05

## 2017-02-01 RX ADMIN — Medication 200 MILLIGRAM(S): at 09:38

## 2017-02-01 RX ADMIN — Medication 2 MILLIGRAM(S): at 17:00

## 2017-02-01 RX ADMIN — Medication 2 MILLIGRAM(S): at 00:30

## 2017-02-01 RX ADMIN — CARVEDILOL PHOSPHATE 25 MILLIGRAM(S): 80 CAPSULE, EXTENDED RELEASE ORAL at 06:56

## 2017-02-01 RX ADMIN — SODIUM CHLORIDE 3 MILLILITER(S): 9 INJECTION INTRAMUSCULAR; INTRAVENOUS; SUBCUTANEOUS at 22:28

## 2017-02-01 RX ADMIN — Medication 40 MILLIEQUIVALENT(S): at 09:39

## 2017-02-01 RX ADMIN — Medication 2 MILLIGRAM(S): at 16:23

## 2017-02-01 RX ADMIN — Medication 2 MILLIGRAM(S): at 13:21

## 2017-02-01 RX ADMIN — Medication 40 MILLIEQUIVALENT(S): at 09:38

## 2017-02-01 RX ADMIN — Medication 2 MILLIGRAM(S): at 09:45

## 2017-02-01 RX ADMIN — Medication 4 MILLIGRAM(S): at 07:27

## 2017-02-01 RX ADMIN — LISINOPRIL 20 MILLIGRAM(S): 2.5 TABLET ORAL at 06:56

## 2017-02-01 RX ADMIN — Medication 200 MILLIGRAM(S): at 17:59

## 2017-02-01 RX ADMIN — AMLODIPINE BESYLATE 5 MILLIGRAM(S): 2.5 TABLET ORAL at 06:57

## 2017-02-01 RX ADMIN — Medication 325 MILLIGRAM(S): at 12:09

## 2017-02-01 RX ADMIN — Medication 2 MILLIGRAM(S): at 22:41

## 2017-02-01 NOTE — PROGRESS NOTE ADULT - ATTENDING COMMENTS
plan of care discussed with patient, at this point patient does not have capacity to sign out against medical advice, continue 1:1 observation for patient safety

## 2017-02-01 NOTE — CHART NOTE - NSCHARTNOTEFT_GEN_A_CORE
Hospitalist-Medicine NP    CC: "I want to go out to smoke"    Requested by RN to evaluate pt, as per RN he is agitated, requesting to leave to smoke. Wife at bedside    VSS    P.E:   Heart: S1 S2 RRR  Lungs: CTAB  Neuro: Alert, and agitated at this time.   CIWAA as per RN 22    A/P: 36 year old admitted with ETOH withdrawal seen for agitation  Pt pulled out IV hep lock   Stat dose of Ativan 2 mg IM given  continue current care  continue tele monitor  ICU consult  Placed a call out to Dr. Suarez  Will continue to monitor Hospitalist-Medicine NP    CC: "I want to go out to smoke"    Requested by RN to evaluate pt, as per RN he is agitated, requesting to leave to smoke. Wife at bedside    VSS    P.E:   Heart: S1 S2 RRR  Lungs: CTAB  Neuro: Alert, and agitated at this time.   CIWAA as per RN 22 but now CIWAA now is 5    A/P: 36 year old admitted with ETOH withdrawal seen for agitation  Pt pulled out IV hep lock   Stat dose of Ativan 2 mg IM given  continue current care  continue tele monitor  Placed a call out to Dr. Suarez  Will continue to monitor

## 2017-02-01 NOTE — PROGRESS NOTE ADULT - SUBJECTIVE AND OBJECTIVE BOX
Patient is a 36y old  Male who presents with a chief complaint of passed out found on floor by wife (30 Jan 2017 17:30)      INTERVAL HPI/OVERNIGHT EVENTS:  patient increasingly agitated and disoriented overnight.  currently alert, and somewhat disoriented.  No nausea or vomiting.    MEDICATIONS  (STANDING):  sodium chloride 0.9% lock flush 3milliLiter(s) IV Push every 8 hours  LORazepam     Tablet 1milliGRAM(s) Oral every 4 hours  sodium chloride 0.9%. 1000milliLiter(s) IV Continuous <Continuous>  LORazepam     Tablet milliGRAM(s) Oral   simvastatin 20milliGRAM(s) Oral at bedtime  hydroxychloroquine 200milliGRAM(s) Oral two times a day with meals  carvedilol 25milliGRAM(s) Oral every 12 hours  amLODIPine   Tablet 5milliGRAM(s) Oral two times a day  pantoprazole    Tablet 40milliGRAM(s) Oral before breakfast  lisinopril 20milliGRAM(s) Oral two times a day  ferrous    sulfate 325milliGRAM(s) Oral daily  magnesium oxide 400milliGRAM(s) Oral daily  nicotine -  14 mG/24Hr(s) Patch 1patch Transdermal daily    MEDICATIONS  (PRN):  ALBUTerol    90 MICROgram(s) HFA Inhaler 2Puff(s) Inhalation every 6 hours PRN Shortness of Breath and/or Wheezing  LORazepam     Tablet 2milliGRAM(s) Oral every 2 hours PRN Symptom-triggered 2 point increase in CIWA-Ar  LORazepam   Injectable 2milliGRAM(s) IV Push every 1 hour PRN Symptom-triggered: each CIWA -Ar score 8 or GREATER  nicotine  Polacrilex Gum 4milliGRAM(s) Oral every 4 hours PRN cravings      Allergies    sulfa drugs (Unknown)    Intolerances        REVIEW OF SYSTEMS:  CONSTITUTIONAL: No fever, weight loss, or fatigue  EYES: No eye pain, visual disturbances, or discharge  ENMT:  No difficulty hearing, tinnitus, vertigo; No sinus or throat pain  NECK: No pain or stiffness  BREASTS: No pain, masses, or nipple discharge  RESPIRATORY: No cough, wheezing, chills or hemoptysis; No shortness of breath  CARDIOVASCULAR: No chest pain, palpitations, dizziness, or leg swelling  GASTROINTESTINAL: No abdominal or epigastric pain. No nausea, vomiting, or hematemesis  GENITOURINARY: No dysuria, frequency, hematuria, or incontinence  NEUROLOGICAL: + headaches,+ confusion, mild tremor  SKIN: No itching, burning, rashes, or lesions   LYMPH NODES: No enlarged glands  ENDOCRINE: No heat or cold intolerance; No hair loss  MUSCULOSKELETAL: No joint pain or swelling; No muscle, back, or extremity pain  PSYCHIATRIC: + anxiety and labile mood  HEME/LYMPH: No easy bruising, or bleeding gums  ALLERGY AND IMMUNOLOGIC: No hives or eczema    Vital Signs Last 24 Hrs  T(C): 36.7, Max: 37.7 (02-01 @ 05:00)  T(F): 98.1, Max: 99.8 (02-01 @ 05:00)  HR: 98 (95 - 105)  BP: 113/78 (102/64 - 120/69)  BP(mean): --  RR: 18 (17 - 18)  SpO2: 94% (94% - 98%)    PHYSICAL EXAM:  GENERAL: non toxic, mildly agitated  HEAD:  Atraumatic, Normocephalic  EYES: EOMI, PERRLA, conjunctiva and sclera clear  ENMT: No tonsillar erythema, exudates, or enlargement; Moist mucous membranes  NECK: Supple, No JVD  NERVOUS SYSTEM:  Alert & Oriented X2, Motor Strength 5/5 B/L upper and lower extremities  CHEST/LUNG: Clear to auscultation; No rales, rhonchi, wheezing, or rubs  HEART: sinus tachycardia +S1S2  ABDOMEN: Soft, Nontender, Nondistended obese  EXTREMITIES:  2+ Peripheral Pulses, No clubbing, cyanosis, or edema  LYMPH: No lymphadenopathy noted  SKIN: No rashes or lesions    LABS:                        10.7   2.6   )-----------( 104      ( 01 Feb 2017 06:15 )             32.7     01 Feb 2017 06:15    138    |  99     |  23     ----------------------------<  84     3.1     |  33     |  1.13     Ca    7.6        01 Feb 2017 06:15  Phos  2.6       01 Feb 2017 06:15  Mg     1.5       01 Feb 2017 06:15      PTT - ( 31 Jan 2017 07:19 )  PTT:52.0 sec    CAPILLARY BLOOD GLUCOSE      RADIOLOGY & ADDITIONAL TESTS:    Imaging Personally Reviewed:  [x ] YES  [ ] NO    Consultant(s) Notes Reviewed:  [x ] YES  [ ] NO    Care Discussed with Consultants/Other Providers [x ] YES  [ ] NO

## 2017-02-02 LAB
ANION GAP SERPL CALC-SCNC: 7 MMOL/L — SIGNIFICANT CHANGE UP (ref 5–17)
BASOPHILS # BLD AUTO: 0 K/UL — SIGNIFICANT CHANGE UP (ref 0–0.2)
BASOPHILS NFR BLD AUTO: 0.5 % — SIGNIFICANT CHANGE UP (ref 0–2)
BUN SERPL-MCNC: 13 MG/DL — SIGNIFICANT CHANGE UP (ref 7–23)
CALCIUM SERPL-MCNC: 7.8 MG/DL — LOW (ref 8.5–10.1)
CHLORIDE SERPL-SCNC: 102 MMOL/L — SIGNIFICANT CHANGE UP (ref 96–108)
CO2 SERPL-SCNC: 32 MMOL/L — HIGH (ref 22–31)
CREAT SERPL-MCNC: 1.02 MG/DL — SIGNIFICANT CHANGE UP (ref 0.5–1.3)
EOSINOPHIL # BLD AUTO: 0 K/UL — SIGNIFICANT CHANGE UP (ref 0–0.5)
EOSINOPHIL NFR BLD AUTO: 1.7 % — SIGNIFICANT CHANGE UP (ref 0–6)
GLUCOSE SERPL-MCNC: 80 MG/DL — SIGNIFICANT CHANGE UP (ref 70–99)
HCT VFR BLD CALC: 34 % — LOW (ref 39–50)
HGB BLD-MCNC: 11.1 G/DL — LOW (ref 13–17)
LYMPHOCYTES # BLD AUTO: 0.5 K/UL — LOW (ref 1–3.3)
LYMPHOCYTES # BLD AUTO: 21.2 % — SIGNIFICANT CHANGE UP (ref 13–44)
MAGNESIUM SERPL-MCNC: 1.3 MG/DL — LOW (ref 1.8–2.4)
MCHC RBC-ENTMCNC: 27.6 PG — SIGNIFICANT CHANGE UP (ref 27–34)
MCHC RBC-ENTMCNC: 32.8 GM/DL — SIGNIFICANT CHANGE UP (ref 32–36)
MCV RBC AUTO: 84.3 FL — SIGNIFICANT CHANGE UP (ref 80–100)
MONOCYTES # BLD AUTO: 0.7 K/UL — SIGNIFICANT CHANGE UP (ref 0–0.9)
MONOCYTES NFR BLD AUTO: 29.2 % — HIGH (ref 2–14)
NEUTROPHILS # BLD AUTO: 1.1 K/UL — LOW (ref 1.8–7.4)
NEUTROPHILS NFR BLD AUTO: 47.3 % — SIGNIFICANT CHANGE UP (ref 43–77)
PLATELET # BLD AUTO: 100 K/UL — LOW (ref 150–400)
POTASSIUM SERPL-MCNC: 3.2 MMOL/L — LOW (ref 3.5–5.3)
POTASSIUM SERPL-SCNC: 3.2 MMOL/L — LOW (ref 3.5–5.3)
RBC # BLD: 4.03 M/UL — LOW (ref 4.2–5.8)
RBC # FLD: 19.4 % — HIGH (ref 11–15)
SODIUM SERPL-SCNC: 141 MMOL/L — SIGNIFICANT CHANGE UP (ref 135–145)
WBC # BLD: 2.3 K/UL — LOW (ref 3.8–10.5)
WBC # FLD AUTO: 2.3 K/UL — LOW (ref 3.8–10.5)

## 2017-02-02 PROCEDURE — 99233 SBSQ HOSP IP/OBS HIGH 50: CPT

## 2017-02-02 RX ORDER — MAGNESIUM SULFATE 500 MG/ML
2 VIAL (ML) INJECTION ONCE
Qty: 0 | Refills: 0 | Status: COMPLETED | OUTPATIENT
Start: 2017-02-02 | End: 2017-02-02

## 2017-02-02 RX ORDER — POTASSIUM CHLORIDE 20 MEQ
20 PACKET (EA) ORAL
Qty: 0 | Refills: 0 | Status: COMPLETED | OUTPATIENT
Start: 2017-02-02 | End: 2017-02-02

## 2017-02-02 RX ADMIN — Medication 3 MILLIGRAM(S): at 21:46

## 2017-02-02 RX ADMIN — Medication 20 MILLIEQUIVALENT(S): at 18:24

## 2017-02-02 RX ADMIN — PANTOPRAZOLE SODIUM 40 MILLIGRAM(S): 20 TABLET, DELAYED RELEASE ORAL at 09:14

## 2017-02-02 RX ADMIN — SODIUM CHLORIDE 3 MILLILITER(S): 9 INJECTION INTRAMUSCULAR; INTRAVENOUS; SUBCUTANEOUS at 21:49

## 2017-02-02 RX ADMIN — SODIUM CHLORIDE 3 MILLILITER(S): 9 INJECTION INTRAMUSCULAR; INTRAVENOUS; SUBCUTANEOUS at 05:27

## 2017-02-02 RX ADMIN — Medication 4 MILLIGRAM(S): at 14:50

## 2017-02-02 RX ADMIN — Medication 200 MILLIGRAM(S): at 09:14

## 2017-02-02 RX ADMIN — ALBUTEROL 2 PUFF(S): 90 AEROSOL, METERED ORAL at 18:24

## 2017-02-02 RX ADMIN — Medication 4 MILLIGRAM(S): at 10:49

## 2017-02-02 RX ADMIN — AMLODIPINE BESYLATE 5 MILLIGRAM(S): 2.5 TABLET ORAL at 05:22

## 2017-02-02 RX ADMIN — SODIUM CHLORIDE 3 MILLILITER(S): 9 INJECTION INTRAMUSCULAR; INTRAVENOUS; SUBCUTANEOUS at 13:28

## 2017-02-02 RX ADMIN — Medication 325 MILLIGRAM(S): at 13:33

## 2017-02-02 RX ADMIN — Medication 4 MILLIGRAM(S): at 00:02

## 2017-02-02 RX ADMIN — AMLODIPINE BESYLATE 5 MILLIGRAM(S): 2.5 TABLET ORAL at 18:25

## 2017-02-02 RX ADMIN — LISINOPRIL 20 MILLIGRAM(S): 2.5 TABLET ORAL at 05:22

## 2017-02-02 RX ADMIN — Medication 200 MILLIGRAM(S): at 18:24

## 2017-02-02 RX ADMIN — Medication 20 MILLIEQUIVALENT(S): at 15:58

## 2017-02-02 RX ADMIN — Medication 4 MILLIGRAM(S): at 05:18

## 2017-02-02 RX ADMIN — Medication 50 GRAM(S): at 18:25

## 2017-02-02 RX ADMIN — SIMVASTATIN 20 MILLIGRAM(S): 20 TABLET, FILM COATED ORAL at 21:48

## 2017-02-02 RX ADMIN — Medication 4 MILLIGRAM(S): at 20:27

## 2017-02-02 RX ADMIN — MAGNESIUM OXIDE 400 MG ORAL TABLET 400 MILLIGRAM(S): 241.3 TABLET ORAL at 13:33

## 2017-02-02 RX ADMIN — CARVEDILOL PHOSPHATE 25 MILLIGRAM(S): 80 CAPSULE, EXTENDED RELEASE ORAL at 18:25

## 2017-02-02 RX ADMIN — LISINOPRIL 20 MILLIGRAM(S): 2.5 TABLET ORAL at 21:47

## 2017-02-02 RX ADMIN — Medication 1 PATCH: at 13:34

## 2017-02-02 RX ADMIN — CARVEDILOL PHOSPHATE 25 MILLIGRAM(S): 80 CAPSULE, EXTENDED RELEASE ORAL at 05:22

## 2017-02-02 RX ADMIN — Medication 20 MILLIEQUIVALENT(S): at 13:33

## 2017-02-02 RX ADMIN — Medication 1 PATCH: at 13:33

## 2017-02-02 RX ADMIN — Medication 4 MILLIGRAM(S): at 18:24

## 2017-02-02 NOTE — PROGRESS NOTE ADULT - SUBJECTIVE AND OBJECTIVE BOX
Patient is a 36y old  Male who presents with a chief complaint of passed out found on floor by wife (30 Jan 2017 17:30)      INTERVAL HPI/OVERNIGHT EVENTS:  patient increasingly agitated overnight.  now much more calm notes abdominal discomfort and some nausea.   no vomiting fever or chills.  MEDICATIONS  (STANDING):  sodium chloride 0.9% lock flush 3milliLiter(s) IV Push every 8 hours  sodium chloride 0.9%. 1000milliLiter(s) IV Continuous <Continuous>  simvastatin 20milliGRAM(s) Oral at bedtime  hydroxychloroquine 200milliGRAM(s) Oral two times a day with meals  carvedilol 25milliGRAM(s) Oral every 12 hours  amLODIPine   Tablet 5milliGRAM(s) Oral two times a day  pantoprazole    Tablet 40milliGRAM(s) Oral before breakfast  lisinopril 20milliGRAM(s) Oral two times a day  ferrous    sulfate 325milliGRAM(s) Oral daily  magnesium oxide 400milliGRAM(s) Oral daily  nicotine -  14 mG/24Hr(s) Patch 1patch Transdermal daily  LORazepam   Injectable milliGRAM(s) IV Push   LORazepam   Injectable 4milliGRAM(s) IV Push every 4 hours  LORazepam   Injectable 3milliGRAM(s) IV Push every 4 hours    MEDICATIONS  (PRN):  ALBUTerol    90 MICROgram(s) HFA Inhaler 2Puff(s) Inhalation every 6 hours PRN Shortness of Breath and/or Wheezing  LORazepam     Tablet 2milliGRAM(s) Oral every 2 hours PRN Symptom-triggered 2 point increase in CIWA-Ar  LORazepam   Injectable 2milliGRAM(s) IV Push every 1 hour PRN Symptom-triggered: each CIWA -Ar score 8 or GREATER  nicotine  Polacrilex Gum 4milliGRAM(s) Oral every 4 hours PRN cravings      Allergies    sulfa drugs (Unknown)    Intolerances        REVIEW OF SYSTEMS:  CONSTITUTIONAL: No fever, weight loss, or fatigue  EYES: No eye pain, visual disturbances, or discharge  ENMT:  No difficulty hearing, tinnitus, vertigo; No sinus or throat pain  NECK: No pain or stiffness  BREASTS: No pain, masses, or nipple discharge  RESPIRATORY: No cough, wheezing, chills or hemoptysis; No shortness of breath  CARDIOVASCULAR: No chest pain, palpitations, dizziness, or leg swelling  GASTROINTESTINAL:+right sided abdominal pain.  some nausea, no vomiting  GENITOURINARY: No dysuria, frequency, hematuria, or incontinence  NEUROLOGICAL: No headaches, memory loss, loss of strength, numbness, or tremors  SKIN: No itching, burning, rashes, or lesions   LYMPH NODES: No enlarged glands  ENDOCRINE: No heat or cold intolerance; No hair loss  MUSCULOSKELETAL: No joint pain or swelling; No muscle, back, or extremity pain  PSYCHIATRIC:some anxiety and insomnia. No depression difficulty sleeping  HEME/LYMPH: No easy bruising, or bleeding gums  ALLERGY AND IMMUNOLOGIC: No hives or eczema    Vital Signs Last 24 Hrs  T(C): 36.6, Max: 36.7 (02-02 @ 00:37)  T(F): 97.9, Max: 98 (02-02 @ 00:37)  HR: 94 (94 - 108)  BP: 129/78 (125/75 - 129/79)  BP(mean): --  RR: 18 (18 - 20)  SpO2: 92% (92% - 95%)    PHYSICAL EXAM:  GENERAL: NAD, much calmer than yesterday  HEAD:  Atraumatic, Normocephalic  EYES: EOMI, PERRLA, conjunctiva and sclera clear  ENMT: No tonsillar erythema, exudates, or enlargement; Moist mucous membranes  NECK: Supple, No JVD  NERVOUS SYSTEM:  Alert & Oriented X3, Good concentration; Motor Strength 5/5 B/L upper and lower extremities; DTRs 2+ intact and symmetric  CHEST/LUNG: Clear to auscultation bilaterally; No rales, rhonchi, wheezing, or rubs  HEART: Regular rate and rhythm; No murmurs, rubs, or gallops  ABDOMEN: Soft, mild right sided tenderness, ecchymoses over right uppere quadrant. Nondistended; Bowel sounds present  EXTREMITIES:  2+ Peripheral Pulses, No clubbing, cyanosis, or edema  LYMPH: No lymphadenopathy noted  SKIN: No rashes or lesions    LABS:                        11.1   2.3   )-----------( 100      ( 02 Feb 2017 08:28 )             34.0     02 Feb 2017 08:28    141    |  102    |  13     ----------------------------<  80     3.2     |  32     |  1.02     Ca    7.8        02 Feb 2017 08:28  Phos  2.6       01 Feb 2017 06:15  Mg     1.3       02 Feb 2017 08:28          CAPILLARY BLOOD GLUCOSE      RADIOLOGY & ADDITIONAL TESTS:    Imaging Personally Reviewed:  [x ] YES  [ ] NO    Consultant(s) Notes Reviewed:  [ x] YES  [ ] NO    Care Discussed with Consultants/Other Providers [x ] YES  [ ] NO

## 2017-02-03 LAB
ANION GAP SERPL CALC-SCNC: 8 MMOL/L — SIGNIFICANT CHANGE UP (ref 5–17)
ANISOCYTOSIS BLD QL: SLIGHT — SIGNIFICANT CHANGE UP
BUN SERPL-MCNC: 11 MG/DL — SIGNIFICANT CHANGE UP (ref 7–23)
CALCIUM SERPL-MCNC: 7.9 MG/DL — LOW (ref 8.5–10.1)
CHLORIDE SERPL-SCNC: 104 MMOL/L — SIGNIFICANT CHANGE UP (ref 96–108)
CO2 SERPL-SCNC: 30 MMOL/L — SIGNIFICANT CHANGE UP (ref 22–31)
CREAT SERPL-MCNC: 0.98 MG/DL — SIGNIFICANT CHANGE UP (ref 0.5–1.3)
EOSINOPHIL NFR BLD AUTO: 3 % — SIGNIFICANT CHANGE UP (ref 0–6)
GLUCOSE SERPL-MCNC: 90 MG/DL — SIGNIFICANT CHANGE UP (ref 70–99)
HCT VFR BLD CALC: 34.9 % — LOW (ref 39–50)
HGB BLD-MCNC: 11.3 G/DL — LOW (ref 13–17)
HYPOCHROMIA BLD QL: SLIGHT — SIGNIFICANT CHANGE UP
LYMPHOCYTES # BLD AUTO: 26 % — SIGNIFICANT CHANGE UP (ref 13–44)
MACROCYTES BLD QL: SLIGHT — SIGNIFICANT CHANGE UP
MAGNESIUM SERPL-MCNC: 1.5 MG/DL — LOW (ref 1.8–2.4)
MCHC RBC-ENTMCNC: 27 PG — SIGNIFICANT CHANGE UP (ref 27–34)
MCHC RBC-ENTMCNC: 32.5 GM/DL — SIGNIFICANT CHANGE UP (ref 32–36)
MCV RBC AUTO: 83.3 FL — SIGNIFICANT CHANGE UP (ref 80–100)
MICROCYTES BLD QL: SLIGHT — SIGNIFICANT CHANGE UP
MONOCYTES NFR BLD AUTO: 18 % — HIGH (ref 2–14)
NEUTROPHILS NFR BLD AUTO: 53 % — SIGNIFICANT CHANGE UP (ref 43–77)
PLAT MORPH BLD: NORMAL — SIGNIFICANT CHANGE UP
PLATELET # BLD AUTO: 136 K/UL — LOW (ref 150–400)
POTASSIUM SERPL-MCNC: 3.5 MMOL/L — SIGNIFICANT CHANGE UP (ref 3.5–5.3)
POTASSIUM SERPL-SCNC: 3.5 MMOL/L — SIGNIFICANT CHANGE UP (ref 3.5–5.3)
RBC # BLD: 4.2 M/UL — SIGNIFICANT CHANGE UP (ref 4.2–5.8)
RBC # FLD: 19.2 % — HIGH (ref 11–15)
RBC BLD AUTO: ABNORMAL
SODIUM SERPL-SCNC: 142 MMOL/L — SIGNIFICANT CHANGE UP (ref 135–145)
WBC # BLD: 2.7 K/UL — LOW (ref 3.8–10.5)
WBC # FLD AUTO: 2.7 K/UL — LOW (ref 3.8–10.5)

## 2017-02-03 PROCEDURE — 99233 SBSQ HOSP IP/OBS HIGH 50: CPT

## 2017-02-03 PROCEDURE — 70551 MRI BRAIN STEM W/O DYE: CPT | Mod: 26

## 2017-02-03 RX ORDER — ENOXAPARIN SODIUM 100 MG/ML
40 INJECTION SUBCUTANEOUS EVERY 24 HOURS
Qty: 0 | Refills: 0 | Status: DISCONTINUED | OUTPATIENT
Start: 2017-02-03 | End: 2017-02-06

## 2017-02-03 RX ORDER — POTASSIUM CHLORIDE 20 MEQ
20 PACKET (EA) ORAL ONCE
Qty: 0 | Refills: 0 | Status: COMPLETED | OUTPATIENT
Start: 2017-02-03 | End: 2017-02-03

## 2017-02-03 RX ORDER — MAGNESIUM SULFATE 500 MG/ML
2 VIAL (ML) INJECTION ONCE
Qty: 0 | Refills: 0 | Status: COMPLETED | OUTPATIENT
Start: 2017-02-03 | End: 2017-02-03

## 2017-02-03 RX ADMIN — MAGNESIUM OXIDE 400 MG ORAL TABLET 400 MILLIGRAM(S): 241.3 TABLET ORAL at 13:22

## 2017-02-03 RX ADMIN — Medication 3 MILLIGRAM(S): at 02:26

## 2017-02-03 RX ADMIN — SODIUM CHLORIDE 3 MILLILITER(S): 9 INJECTION INTRAMUSCULAR; INTRAVENOUS; SUBCUTANEOUS at 06:44

## 2017-02-03 RX ADMIN — Medication 3 MILLIGRAM(S): at 19:07

## 2017-02-03 RX ADMIN — LISINOPRIL 20 MILLIGRAM(S): 2.5 TABLET ORAL at 06:39

## 2017-02-03 RX ADMIN — Medication 2 MILLIGRAM(S): at 22:18

## 2017-02-03 RX ADMIN — Medication 1 PATCH: at 13:33

## 2017-02-03 RX ADMIN — SODIUM CHLORIDE 3 MILLILITER(S): 9 INJECTION INTRAMUSCULAR; INTRAVENOUS; SUBCUTANEOUS at 22:07

## 2017-02-03 RX ADMIN — SIMVASTATIN 20 MILLIGRAM(S): 20 TABLET, FILM COATED ORAL at 22:19

## 2017-02-03 RX ADMIN — PANTOPRAZOLE SODIUM 40 MILLIGRAM(S): 20 TABLET, DELAYED RELEASE ORAL at 06:40

## 2017-02-03 RX ADMIN — Medication 3 MILLIGRAM(S): at 10:04

## 2017-02-03 RX ADMIN — ENOXAPARIN SODIUM 40 MILLIGRAM(S): 100 INJECTION SUBCUTANEOUS at 19:07

## 2017-02-03 RX ADMIN — LISINOPRIL 20 MILLIGRAM(S): 2.5 TABLET ORAL at 17:29

## 2017-02-03 RX ADMIN — SODIUM CHLORIDE 3 MILLILITER(S): 9 INJECTION INTRAMUSCULAR; INTRAVENOUS; SUBCUTANEOUS at 15:20

## 2017-02-03 RX ADMIN — Medication 3 MILLIGRAM(S): at 15:03

## 2017-02-03 RX ADMIN — AMLODIPINE BESYLATE 5 MILLIGRAM(S): 2.5 TABLET ORAL at 06:40

## 2017-02-03 RX ADMIN — Medication 200 MILLIGRAM(S): at 17:28

## 2017-02-03 RX ADMIN — CARVEDILOL PHOSPHATE 25 MILLIGRAM(S): 80 CAPSULE, EXTENDED RELEASE ORAL at 17:29

## 2017-02-03 RX ADMIN — Medication 1 PATCH: at 13:22

## 2017-02-03 RX ADMIN — Medication 50 GRAM(S): at 13:23

## 2017-02-03 RX ADMIN — CARVEDILOL PHOSPHATE 25 MILLIGRAM(S): 80 CAPSULE, EXTENDED RELEASE ORAL at 06:40

## 2017-02-03 RX ADMIN — AMLODIPINE BESYLATE 5 MILLIGRAM(S): 2.5 TABLET ORAL at 17:28

## 2017-02-03 RX ADMIN — Medication 325 MILLIGRAM(S): at 13:21

## 2017-02-03 RX ADMIN — Medication 20 MILLIEQUIVALENT(S): at 13:23

## 2017-02-03 RX ADMIN — Medication 3 MILLIGRAM(S): at 06:40

## 2017-02-03 RX ADMIN — Medication 200 MILLIGRAM(S): at 07:55

## 2017-02-03 NOTE — PROGRESS NOTE ADULT - SUBJECTIVE AND OBJECTIVE BOX
Patient is a 36y old  Male who presents with a chief complaint of passed out found on floor by wife (30 Jan 2017 17:30)      INTERVAL HPI/OVERNIGHT EVENTS:    MEDICATIONS  (STANDING):  sodium chloride 0.9% lock flush 3milliLiter(s) IV Push every 8 hours  simvastatin 20milliGRAM(s) Oral at bedtime  hydroxychloroquine 200milliGRAM(s) Oral two times a day with meals  carvedilol 25milliGRAM(s) Oral every 12 hours  amLODIPine   Tablet 5milliGRAM(s) Oral two times a day  pantoprazole    Tablet 40milliGRAM(s) Oral before breakfast  lisinopril 20milliGRAM(s) Oral two times a day  ferrous    sulfate 325milliGRAM(s) Oral daily  magnesium oxide 400milliGRAM(s) Oral daily  nicotine -  14 mG/24Hr(s) Patch 1patch Transdermal daily  LORazepam   Injectable milliGRAM(s) IV Push   LORazepam   Injectable 3milliGRAM(s) IV Push every 4 hours  LORazepam   Injectable 2milliGRAM(s) IV Push every 4 hours    MEDICATIONS  (PRN):  ALBUTerol    90 MICROgram(s) HFA Inhaler 2Puff(s) Inhalation every 6 hours PRN Shortness of Breath and/or Wheezing  LORazepam     Tablet 2milliGRAM(s) Oral every 2 hours PRN Symptom-triggered 2 point increase in CIWA-Ar  LORazepam   Injectable 2milliGRAM(s) IV Push every 1 hour PRN Symptom-triggered: each CIWA -Ar score 8 or GREATER  nicotine  Polacrilex Gum 4milliGRAM(s) Oral every 4 hours PRN cravings      Allergies    sulfa drugs (Unknown)    Intolerances        REVIEW OF SYSTEMS:  CONSTITUTIONAL: No fever, weight loss, or fatigue  EYES: No eye pain, visual disturbances, or discharge  ENMT:  No difficulty hearing, tinnitus, vertigo; No sinus or throat pain  NECK: No pain or stiffness  BREASTS: No pain, masses, or nipple discharge  RESPIRATORY: No cough, wheezing, chills or hemoptysis; No shortness of breath  CARDIOVASCULAR: No chest pain, palpitations, dizziness, or leg swelling  GASTROINTESTINAL: No abdominal or epigastric pain. No nausea, vomiting, or hematemesis; No diarrhea or constipation. No melena or hematochezia.  GENITOURINARY: No dysuria, frequency, hematuria, or incontinence  NEUROLOGICAL: No headaches, memory loss, loss of strength, numbness, or tremors  SKIN: No itching, burning, rashes, or lesions   LYMPH NODES: No enlarged glands  ENDOCRINE: No heat or cold intolerance; No hair loss  MUSCULOSKELETAL: No joint pain or swelling; No muscle, back, or extremity pain  PSYCHIATRIC: No depression, anxiety, mood swings, or difficulty sleeping  HEME/LYMPH: No easy bruising, or bleeding gums  ALLERGY AND IMMUNOLOGIC: No hives or eczema    Vital Signs Last 24 Hrs  T(C): 37.1, Max: 37.1 (02-02 @ 23:41)  T(F): 98.8, Max: 98.8 (02-02 @ 23:41)  HR: 89 (87 - 99)  BP: 128/86 (112/70 - 137/92)  BP(mean): --  RR: 18 (18 - 18)  SpO2: 94% (92% - 96%)    PHYSICAL EXAM:  GENERAL: NAD, well-groomed, well-developed  HEAD:  Atraumatic, Normocephalic  EYES: EOMI, PERRLA, conjunctiva and sclera clear  ENMT: No tonsillar erythema, exudates, or enlargement; Moist mucous membranes, Good dentition, No lesions  NECK: Supple, No JVD, Normal thyroid  NERVOUS SYSTEM:  Alert & Oriented X3, Good concentration; Motor Strength 5/5 B/L upper and lower extremities; DTRs 2+ intact and symmetric  CHEST/LUNG: Clear to percussion bilaterally; No rales, rhonchi, wheezing, or rubs  HEART: Regular rate and rhythm; No murmurs, rubs, or gallops  ABDOMEN: Soft, Nontender, Nondistended; Bowel sounds present  EXTREMITIES:  2+ Peripheral Pulses, No clubbing, cyanosis, or edema  LYMPH: No lymphadenopathy noted  SKIN: No rashes or lesions    LABS:                        11.3   2.7   )-----------( 136      ( 03 Feb 2017 07:40 )             34.9     03 Feb 2017 07:40    142    |  104    |  11     ----------------------------<  90     3.5     |  30     |  0.98     Ca    7.9        03 Feb 2017 07:40  Mg     1.5       03 Feb 2017 07:40          CAPILLARY BLOOD GLUCOSE      RADIOLOGY & ADDITIONAL TESTS:    Imaging Personally Reviewed:  [ ] YES  [ ] NO    Consultant(s) Notes Reviewed:  [ ] YES  [ ] NO    Care Discussed with Consultants/Other Providers [ ] YES  [ ] NO Patient is a 36y old  Male who presents with a chief complaint of passed out found on floor by wife (30 Jan 2017 17:30)      INTERVAL HPI/OVERNIGHT EVENTS:  much more calm this am.  Patient comfortable, eating well. evenutally would like to go home. denies abdominal pain at this point.  no tremor.      MEDICATIONS  (STANDING):  sodium chloride 0.9% lock flush 3milliLiter(s) IV Push every 8 hours  simvastatin 20milliGRAM(s) Oral at bedtime  hydroxychloroquine 200milliGRAM(s) Oral two times a day with meals  carvedilol 25milliGRAM(s) Oral every 12 hours  amLODIPine   Tablet 5milliGRAM(s) Oral two times a day  pantoprazole    Tablet 40milliGRAM(s) Oral before breakfast  lisinopril 20milliGRAM(s) Oral two times a day  ferrous    sulfate 325milliGRAM(s) Oral daily  magnesium oxide 400milliGRAM(s) Oral daily  nicotine -  14 mG/24Hr(s) Patch 1patch Transdermal daily  LORazepam   Injectable milliGRAM(s) IV Push   LORazepam   Injectable 3milliGRAM(s) IV Push every 4 hours  LORazepam   Injectable 2milliGRAM(s) IV Push every 4 hours    MEDICATIONS  (PRN):  ALBUTerol    90 MICROgram(s) HFA Inhaler 2Puff(s) Inhalation every 6 hours PRN Shortness of Breath and/or Wheezing  LORazepam     Tablet 2milliGRAM(s) Oral every 2 hours PRN Symptom-triggered 2 point increase in CIWA-Ar  LORazepam   Injectable 2milliGRAM(s) IV Push every 1 hour PRN Symptom-triggered: each CIWA -Ar score 8 or GREATER  nicotine  Polacrilex Gum 4milliGRAM(s) Oral every 4 hours PRN cravings      Allergies    sulfa drugs (Unknown)    Intolerances        REVIEW OF SYSTEMS:  CONSTITUTIONAL: No fever, weight loss, or fatigue  EYES: No eye pain, visual disturbances, or discharge  ENMT:  No difficulty hearing, tinnitus, vertigo; No sinus or throat pain  NECK: No pain or stiffness  BREASTS: No pain, masses, or nipple discharge  RESPIRATORY: No cough, wheezing, chills or hemoptysis; No shortness of breath  CARDIOVASCULAR: No chest pain, palpitations, dizziness, or leg swelling  GASTROINTESTINAL: No abdominal or epigastric pain. No nausea, vomiting, or hematemesis; No diarrhea or constipation. No melena or hematochezia.  GENITOURINARY: No dysuria, frequency, hematuria, or incontinence  NEUROLOGICAL: No headaches, memory loss, loss of strength, numbness, or tremors  SKIN: No itching, burning, rashes, or lesions   LYMPH NODES: No enlarged glands  ENDOCRINE: No heat or cold intolerance; No hair loss  MUSCULOSKELETAL: No joint pain or swelling; No muscle, back, or extremity pain  PSYCHIATRIC: some anxiety, sleeping without a problem  HEME/LYMPH: No easy bruising, or bleeding gums  ALLERGY AND IMMUNOLOGIC: No hives or eczema    Vital Signs Last 24 Hrs  T(C): 37.1, Max: 37.1 (02-02 @ 23:41)  T(F): 98.8, Max: 98.8 (02-02 @ 23:41)  HR: 89 (87 - 99)  BP: 128/86 (112/70 - 137/92)  BP(mean): --  RR: 18 (18 - 18)  SpO2: 94% (92% - 96%)    PHYSICAL EXAM:  GENERAL: NAD,  calm  HEAD:  Atraumatic, Normocephalic  EYES: EOMI, PERRLA, conjunctiva and sclera clear  ENMT: No tonsillar erythema, exudates, or enlargement; Moist mucous membranes  NECK: Supple, No JVD  NERVOUS SYSTEM:  Alert & Oriented X3, Good concentration; Motor Strength 5/5 B/L upper and lower extremities; DTRs 2+ intact and symmetric  CHEST/LUNG: Clear to auscultation bilaterally; No rales, rhonchi, wheezing, or rubs  HEART: Regular rate and rhythm; No murmurs, rubs, or gallops  ABDOMEN: Soft, mild right sided tenderness, ecchymoses over right upper quadrant. Nondistended; Bowel sounds present  EXTREMITIES:  2+ Peripheral Pulses, No clubbing, cyanosis, or edema  LYMPH: No lymphadenopathy noted  SKIN: No rashes or lesions    LABS:                        11.3   2.7   )-----------( 136      ( 03 Feb 2017 07:40 )             34.9     03 Feb 2017 07:40    142    |  104    |  11     ----------------------------<  90     3.5     |  30     |  0.98     Ca    7.9        03 Feb 2017 07:40  Mg     1.5       03 Feb 2017 07:40          CAPILLARY BLOOD GLUCOSE      RADIOLOGY & ADDITIONAL TESTS:    Imaging Personally Reviewed:  [ ] YES  [ ] NO    Consultant(s) Notes Reviewed:  [ ] YES  [ ] NO    Care Discussed with Consultants/Other Providers [ ] YES  [ ] NO

## 2017-02-04 LAB
ANION GAP SERPL CALC-SCNC: 9 MMOL/L — SIGNIFICANT CHANGE UP (ref 5–17)
BASOPHILS # BLD AUTO: 0.1 K/UL — SIGNIFICANT CHANGE UP (ref 0–0.2)
BASOPHILS NFR BLD AUTO: 2.3 % — HIGH (ref 0–2)
BUN SERPL-MCNC: 13 MG/DL — SIGNIFICANT CHANGE UP (ref 7–23)
CALCIUM SERPL-MCNC: 8.3 MG/DL — LOW (ref 8.5–10.1)
CHLORIDE SERPL-SCNC: 104 MMOL/L — SIGNIFICANT CHANGE UP (ref 96–108)
CO2 SERPL-SCNC: 30 MMOL/L — SIGNIFICANT CHANGE UP (ref 22–31)
CREAT SERPL-MCNC: 1.07 MG/DL — SIGNIFICANT CHANGE UP (ref 0.5–1.3)
EOSINOPHIL # BLD AUTO: 0.1 K/UL — SIGNIFICANT CHANGE UP (ref 0–0.5)
EOSINOPHIL NFR BLD AUTO: 4 % — SIGNIFICANT CHANGE UP (ref 0–6)
GLUCOSE SERPL-MCNC: 89 MG/DL — SIGNIFICANT CHANGE UP (ref 70–99)
HCT VFR BLD CALC: 35 % — LOW (ref 39–50)
HGB BLD-MCNC: 11.8 G/DL — LOW (ref 13–17)
LYMPHOCYTES # BLD AUTO: 0.5 K/UL — LOW (ref 1–3.3)
LYMPHOCYTES # BLD AUTO: 20.2 % — SIGNIFICANT CHANGE UP (ref 13–44)
MAGNESIUM SERPL-MCNC: 1.6 MG/DL — LOW (ref 1.8–2.4)
MCHC RBC-ENTMCNC: 28.6 PG — SIGNIFICANT CHANGE UP (ref 27–34)
MCHC RBC-ENTMCNC: 33.7 GM/DL — SIGNIFICANT CHANGE UP (ref 32–36)
MCV RBC AUTO: 84.8 FL — SIGNIFICANT CHANGE UP (ref 80–100)
MONOCYTES # BLD AUTO: 0.8 K/UL — SIGNIFICANT CHANGE UP (ref 0–0.9)
MONOCYTES NFR BLD AUTO: 30.4 % — HIGH (ref 2–14)
NEUTROPHILS # BLD AUTO: 1.1 K/UL — LOW (ref 1.8–7.4)
NEUTROPHILS NFR BLD AUTO: 43.1 % — SIGNIFICANT CHANGE UP (ref 43–77)
PLATELET # BLD AUTO: 148 K/UL — LOW (ref 150–400)
POTASSIUM SERPL-MCNC: 3.9 MMOL/L — SIGNIFICANT CHANGE UP (ref 3.5–5.3)
POTASSIUM SERPL-SCNC: 3.9 MMOL/L — SIGNIFICANT CHANGE UP (ref 3.5–5.3)
RBC # BLD: 4.12 M/UL — LOW (ref 4.2–5.8)
RBC # FLD: 19.8 % — HIGH (ref 11–15)
SODIUM SERPL-SCNC: 143 MMOL/L — SIGNIFICANT CHANGE UP (ref 135–145)
WBC # BLD: 2.5 K/UL — LOW (ref 3.8–10.5)
WBC # FLD AUTO: 2.5 K/UL — LOW (ref 3.8–10.5)

## 2017-02-04 PROCEDURE — 99232 SBSQ HOSP IP/OBS MODERATE 35: CPT

## 2017-02-04 RX ORDER — MAGNESIUM SULFATE 500 MG/ML
1 VIAL (ML) INJECTION ONCE
Qty: 0 | Refills: 0 | Status: COMPLETED | OUTPATIENT
Start: 2017-02-04 | End: 2017-02-04

## 2017-02-04 RX ADMIN — AMLODIPINE BESYLATE 5 MILLIGRAM(S): 2.5 TABLET ORAL at 06:18

## 2017-02-04 RX ADMIN — Medication 200 MILLIGRAM(S): at 07:29

## 2017-02-04 RX ADMIN — SIMVASTATIN 20 MILLIGRAM(S): 20 TABLET, FILM COATED ORAL at 21:57

## 2017-02-04 RX ADMIN — PANTOPRAZOLE SODIUM 40 MILLIGRAM(S): 20 TABLET, DELAYED RELEASE ORAL at 06:19

## 2017-02-04 RX ADMIN — Medication 2 MILLIGRAM(S): at 02:02

## 2017-02-04 RX ADMIN — CARVEDILOL PHOSPHATE 25 MILLIGRAM(S): 80 CAPSULE, EXTENDED RELEASE ORAL at 06:18

## 2017-02-04 RX ADMIN — Medication 2 MILLIGRAM(S): at 06:19

## 2017-02-04 RX ADMIN — MAGNESIUM OXIDE 400 MG ORAL TABLET 400 MILLIGRAM(S): 241.3 TABLET ORAL at 11:19

## 2017-02-04 RX ADMIN — SODIUM CHLORIDE 3 MILLILITER(S): 9 INJECTION INTRAMUSCULAR; INTRAVENOUS; SUBCUTANEOUS at 21:54

## 2017-02-04 RX ADMIN — SODIUM CHLORIDE 3 MILLILITER(S): 9 INJECTION INTRAMUSCULAR; INTRAVENOUS; SUBCUTANEOUS at 15:39

## 2017-02-04 RX ADMIN — AMLODIPINE BESYLATE 5 MILLIGRAM(S): 2.5 TABLET ORAL at 17:32

## 2017-02-04 RX ADMIN — LISINOPRIL 20 MILLIGRAM(S): 2.5 TABLET ORAL at 17:33

## 2017-02-04 RX ADMIN — SODIUM CHLORIDE 3 MILLILITER(S): 9 INJECTION INTRAMUSCULAR; INTRAVENOUS; SUBCUTANEOUS at 06:07

## 2017-02-04 RX ADMIN — Medication 325 MILLIGRAM(S): at 11:19

## 2017-02-04 RX ADMIN — Medication 2 MILLIGRAM(S): at 15:36

## 2017-02-04 RX ADMIN — Medication 1 PATCH: at 12:07

## 2017-02-04 RX ADMIN — CARVEDILOL PHOSPHATE 25 MILLIGRAM(S): 80 CAPSULE, EXTENDED RELEASE ORAL at 17:33

## 2017-02-04 RX ADMIN — ALBUTEROL 2 PUFF(S): 90 AEROSOL, METERED ORAL at 17:36

## 2017-02-04 RX ADMIN — Medication 2 MILLIGRAM(S): at 10:46

## 2017-02-04 RX ADMIN — LISINOPRIL 20 MILLIGRAM(S): 2.5 TABLET ORAL at 06:19

## 2017-02-04 RX ADMIN — Medication 2 MILLIGRAM(S): at 20:20

## 2017-02-04 RX ADMIN — ENOXAPARIN SODIUM 40 MILLIGRAM(S): 100 INJECTION SUBCUTANEOUS at 17:33

## 2017-02-04 RX ADMIN — Medication 200 MILLIGRAM(S): at 17:32

## 2017-02-04 RX ADMIN — Medication 1 PATCH: at 12:04

## 2017-02-04 RX ADMIN — Medication 100 GRAM(S): at 12:04

## 2017-02-04 NOTE — PROGRESS NOTE ADULT - SUBJECTIVE AND OBJECTIVE BOX
HPI:  27 yo  male etoh abuse has been drinking 1 quart vodka daily except for yesterday when he had 1 pint got oob to go to bathroom and was later found by his wife when she came  home from work lying on back with incontinence of stool no incontinence of urine . event was not witnessed and pt only remembers getting up . he experienced pain to left rib cage upon being awakened no pain to arms legs backside. no pmh of similar event ekg in er sinus rhythm slight ivcd qrs 10.5 otc ok . pt noted with cough white sputum , pt noted l flank pain upon awakening and has large hematoma to l flank ct scan shown no traumatic injury but pt has c/o pleuritic chest pain dyspnea  pmh pf copd active smoker 1 ppd , htn, lupus , born with 1 kidney ,thrombocytopenia , afib  now in sinus, enlarged heart, diverticulosis , psh cataracts throat cystectomy , esophageal stricture dilation x 3 episodes   labs significant got ptt of 50 , k+ 3.2 (30 Jan 2017 17:30)  pt   is   c/o   pain    at   LT   Lower   flank     area     PAST MEDICAL & SURGICAL HISTORY:  Tobacco abuse  Chronic obstructive pulmonary disease, unspecified COPD type  Platelets decreased  Kidney problem: born with 1 kidney, right side  Heart valve problem: leaky  Diverticulitis  Enlarged heart  A-fib  Lupus  History of throat surgery: stretched  History of throat surgery: cyst removed  Cataract (lens) fragments in eye following cataract surgery, left eye      MEDICATIONS  (STANDING):  sodium chloride 0.9% lock flush 3milliLiter(s) IV Push every 8 hours  simvastatin 20milliGRAM(s) Oral at bedtime  hydroxychloroquine 200milliGRAM(s) Oral two times a day with meals  carvedilol 25milliGRAM(s) Oral every 12 hours  amLODIPine   Tablet 5milliGRAM(s) Oral two times a day  pantoprazole    Tablet 40milliGRAM(s) Oral before breakfast  lisinopril 20milliGRAM(s) Oral two times a day  ferrous    sulfate 325milliGRAM(s) Oral daily  magnesium oxide 400milliGRAM(s) Oral daily  nicotine -  14 mG/24Hr(s) Patch 1patch Transdermal daily  LORazepam   Injectable milliGRAM(s) IV Push   LORazepam   Injectable 2milliGRAM(s) IV Push every 4 hours  LORazepam   Injectable 1milliGRAM(s) IV Push every 4 hours  enoxaparin Injectable 40milliGRAM(s) SubCutaneous every 24 hours    MEDICATIONS  (PRN):  ALBUTerol    90 MICROgram(s) HFA Inhaler 2Puff(s) Inhalation every 6 hours PRN Shortness of Breath and/or Wheezing  LORazepam     Tablet 2milliGRAM(s) Oral every 2 hours PRN Symptom-triggered 2 point increase in CIWA-Ar  LORazepam   Injectable 2milliGRAM(s) IV Push every 1 hour PRN Symptom-triggered: each CIWA -Ar score 8 or GREATER  nicotine  Polacrilex Gum 4milliGRAM(s) Oral every 4 hours PRN cravings      Allergies    sulfa drugs (Unknown)    Intolerances        SOCIAL HISTORY:    FAMILY HISTORY:  Family history of sleep apnea (Mother)  Family history of diabetes mellitus (Mother)  No pertinent family history in first degree relatives        Vital Signs Last 24 Hrs  T(C): 36.4, Max: 36.8 (02-03 @ 17:23)  T(F): 97.6, Max: 98.2 (02-03 @ 17:23)  HR: 91 (86 - 96)  BP: 121/79 (101/63 - 121/79)  BP(mean): --  RR: 18 (18 - 19)  SpO2: 97% (92% - 97%)    PHYSICAL EXAM:  Pt   is   A X O X 3    HEENT - MILTON/   EOMI   Heart -    S1, S2 + R/R/R   Lung-   B/L   good   air  entry     ABD -   Obese ,  soft   , ND/   NT  , Old    bruise   pr    at   LT   flank    area  , not   tender on touch , no swelling   ext   -   trace     edema   pr   LABS:                        11.8   2.5   )-----------( 148      ( 04 Feb 2017 07:16 )             35.0     04 Feb 2017 07:16    143    |  104    |  13     ----------------------------<  89     3.9     |  30     |  1.07     Ca    8.3        04 Feb 2017 07:16  Mg     1.6       04 Feb 2017 07:16          I&O's Summary  I & Os for 24h ending 04 Feb 2017 07:00  =============================================  IN: 480 ml / OUT: 400 ml / NET: 80 ml    I & Os for current day (as of 04 Feb 2017 12:40)  =============================================  IN: 480 ml / OUT: 0 ml / NET: 480 ml      RADIOLOGY & ADDITIONAL STUDIES:

## 2017-02-05 LAB
HCT VFR BLD CALC: 36.9 % — LOW (ref 39–50)
HGB BLD-MCNC: 12.3 G/DL — LOW (ref 13–17)
MAGNESIUM SERPL-MCNC: 1.5 MG/DL — LOW (ref 1.8–2.4)
MCHC RBC-ENTMCNC: 28 PG — SIGNIFICANT CHANGE UP (ref 27–34)
MCHC RBC-ENTMCNC: 33.4 GM/DL — SIGNIFICANT CHANGE UP (ref 32–36)
MCV RBC AUTO: 83.8 FL — SIGNIFICANT CHANGE UP (ref 80–100)
PLATELET # BLD AUTO: 174 K/UL — SIGNIFICANT CHANGE UP (ref 150–400)
RBC # BLD: 4.4 M/UL — SIGNIFICANT CHANGE UP (ref 4.2–5.8)
RBC # FLD: 18.8 % — HIGH (ref 11–15)
WBC # BLD: 3.9 K/UL — SIGNIFICANT CHANGE UP (ref 3.8–10.5)
WBC # FLD AUTO: 3.9 K/UL — SIGNIFICANT CHANGE UP (ref 3.8–10.5)

## 2017-02-05 PROCEDURE — 99232 SBSQ HOSP IP/OBS MODERATE 35: CPT

## 2017-02-05 RX ORDER — MAGNESIUM SULFATE 500 MG/ML
2 VIAL (ML) INJECTION ONCE
Qty: 0 | Refills: 0 | Status: COMPLETED | OUTPATIENT
Start: 2017-02-05 | End: 2017-02-05

## 2017-02-05 RX ADMIN — Medication 1 MILLIGRAM(S): at 04:14

## 2017-02-05 RX ADMIN — SIMVASTATIN 20 MILLIGRAM(S): 20 TABLET, FILM COATED ORAL at 23:01

## 2017-02-05 RX ADMIN — Medication 200 MILLIGRAM(S): at 18:26

## 2017-02-05 RX ADMIN — CARVEDILOL PHOSPHATE 25 MILLIGRAM(S): 80 CAPSULE, EXTENDED RELEASE ORAL at 18:26

## 2017-02-05 RX ADMIN — Medication 1 PATCH: at 13:10

## 2017-02-05 RX ADMIN — LISINOPRIL 20 MILLIGRAM(S): 2.5 TABLET ORAL at 18:26

## 2017-02-05 RX ADMIN — Medication 1 MILLIGRAM(S): at 09:23

## 2017-02-05 RX ADMIN — LISINOPRIL 20 MILLIGRAM(S): 2.5 TABLET ORAL at 05:57

## 2017-02-05 RX ADMIN — MAGNESIUM OXIDE 400 MG ORAL TABLET 400 MILLIGRAM(S): 241.3 TABLET ORAL at 13:09

## 2017-02-05 RX ADMIN — Medication 1 MILLIGRAM(S): at 00:28

## 2017-02-05 RX ADMIN — SODIUM CHLORIDE 3 MILLILITER(S): 9 INJECTION INTRAMUSCULAR; INTRAVENOUS; SUBCUTANEOUS at 22:59

## 2017-02-05 RX ADMIN — Medication 50 GRAM(S): at 13:09

## 2017-02-05 RX ADMIN — ENOXAPARIN SODIUM 40 MILLIGRAM(S): 100 INJECTION SUBCUTANEOUS at 18:27

## 2017-02-05 RX ADMIN — CARVEDILOL PHOSPHATE 25 MILLIGRAM(S): 80 CAPSULE, EXTENDED RELEASE ORAL at 05:57

## 2017-02-05 RX ADMIN — Medication 1 MILLIGRAM(S): at 13:10

## 2017-02-05 RX ADMIN — AMLODIPINE BESYLATE 5 MILLIGRAM(S): 2.5 TABLET ORAL at 05:57

## 2017-02-05 RX ADMIN — SODIUM CHLORIDE 3 MILLILITER(S): 9 INJECTION INTRAMUSCULAR; INTRAVENOUS; SUBCUTANEOUS at 05:06

## 2017-02-05 RX ADMIN — SODIUM CHLORIDE 3 MILLILITER(S): 9 INJECTION INTRAMUSCULAR; INTRAVENOUS; SUBCUTANEOUS at 12:18

## 2017-02-05 RX ADMIN — Medication 325 MILLIGRAM(S): at 13:09

## 2017-02-05 RX ADMIN — Medication 1 MILLIGRAM(S): at 18:27

## 2017-02-05 RX ADMIN — Medication 200 MILLIGRAM(S): at 09:23

## 2017-02-05 RX ADMIN — AMLODIPINE BESYLATE 5 MILLIGRAM(S): 2.5 TABLET ORAL at 18:26

## 2017-02-05 RX ADMIN — PANTOPRAZOLE SODIUM 40 MILLIGRAM(S): 20 TABLET, DELAYED RELEASE ORAL at 06:28

## 2017-02-05 NOTE — PROGRESS NOTE ADULT - SUBJECTIVE AND OBJECTIVE BOX
Patient is a 36y old  Male who presents with a chief complaint of passed out found on floor by wife (30 Jan 2017 17:30)      INTERVAL HPI/OVERNIGHT EVENTS:  pt    stats   he   feels   better   , Mg    level   1.5    low   MEDICATIONS  (STANDING):  sodium chloride 0.9% lock flush 3milliLiter(s) IV Push every 8 hours  simvastatin 20milliGRAM(s) Oral at bedtime  hydroxychloroquine 200milliGRAM(s) Oral two times a day with meals  carvedilol 25milliGRAM(s) Oral every 12 hours  amLODIPine   Tablet 5milliGRAM(s) Oral two times a day  pantoprazole    Tablet 40milliGRAM(s) Oral before breakfast  lisinopril 20milliGRAM(s) Oral two times a day  ferrous    sulfate 325milliGRAM(s) Oral daily  magnesium oxide 400milliGRAM(s) Oral daily  nicotine -  14 mG/24Hr(s) Patch 1patch Transdermal daily  LORazepam   Injectable milliGRAM(s) IV Push   LORazepam   Injectable 1milliGRAM(s) IV Push every 4 hours  enoxaparin Injectable 40milliGRAM(s) SubCutaneous every 24 hours  magnesium sulfate  IVPB 2Gram(s) IV Intermittent once    MEDICATIONS  (PRN):  ALBUTerol    90 MICROgram(s) HFA Inhaler 2Puff(s) Inhalation every 6 hours PRN Shortness of Breath and/or Wheezing  LORazepam     Tablet 2milliGRAM(s) Oral every 2 hours PRN Symptom-triggered 2 point increase in CIWA-Ar  LORazepam   Injectable 2milliGRAM(s) IV Push every 1 hour PRN Symptom-triggered: each CIWA -Ar score 8 or GREATER  nicotine  Polacrilex Gum 4milliGRAM(s) Oral every 4 hours PRN cravings      Allergies    sulfa drugs (Unknown)    Intolerances        Vital Signs Last 24 Hrs  T(C): 36, Max: 36.4 (02-04 @ 11:28)  T(F): 96.8, Max: 97.6 (02-04 @ 11:28)  HR: 99 (91 - 99)  BP: 131/79 (121/79 - 141/94)  BP(mean): --  RR: 18 (18 - 20)  SpO2: 95% (95% - 99%)    PHYSICAL EXAM:  Morbidly   obese   , A   X O   X 3   &  Not   in  acute   distress   HEENT  -    MILTON/ EOMI   NECK -    Supple   Heart -  S1, S2  +   R/R/R    Lung -   B/L   good   air    entry   ABD -   Obese  , old   bruise  pr   , ND, NT , BS+  EXT - Trace    edema  pr   LABS:                        12.3   3.9   )-----------( 174      ( 05 Feb 2017 07:11 )             36.9     04 Feb 2017 07:16    143    |  104    |  13     ----------------------------<  89     3.9     |  30     |  1.07     Ca    8.3        04 Feb 2017 07:16  Mg     1.5       05 Feb 2017 07:11          CAPILLARY BLOOD GLUCOSE    Lipid panel:           TSH     RADIOLOGY & ADDITIONAL TESTS:    Imaging Personally Reviewed:  [ ] YES  [ ] NO    Consultant(s) Notes Reviewed:  [ ] YES  [ ] NO    Care Discussed with Consultants/Other Providers [ ] YES  [ ] NO

## 2017-02-06 VITALS
RESPIRATION RATE: 18 BRPM | SYSTOLIC BLOOD PRESSURE: 110 MMHG | OXYGEN SATURATION: 97 % | TEMPERATURE: 98 F | DIASTOLIC BLOOD PRESSURE: 60 MMHG | HEART RATE: 87 BPM

## 2017-02-06 LAB
ANION GAP SERPL CALC-SCNC: 8 MMOL/L — SIGNIFICANT CHANGE UP (ref 5–17)
BUN SERPL-MCNC: 15 MG/DL — SIGNIFICANT CHANGE UP (ref 7–23)
CALCIUM SERPL-MCNC: 8 MG/DL — LOW (ref 8.5–10.1)
CHLORIDE SERPL-SCNC: 104 MMOL/L — SIGNIFICANT CHANGE UP (ref 96–108)
CO2 SERPL-SCNC: 27 MMOL/L — SIGNIFICANT CHANGE UP (ref 22–31)
CREAT SERPL-MCNC: 1.19 MG/DL — SIGNIFICANT CHANGE UP (ref 0.5–1.3)
GLUCOSE SERPL-MCNC: 78 MG/DL — SIGNIFICANT CHANGE UP (ref 70–99)
MAGNESIUM SERPL-MCNC: 1.7 MG/DL — LOW (ref 1.8–2.4)
POTASSIUM SERPL-MCNC: 4.1 MMOL/L — SIGNIFICANT CHANGE UP (ref 3.5–5.3)
POTASSIUM SERPL-SCNC: 4.1 MMOL/L — SIGNIFICANT CHANGE UP (ref 3.5–5.3)
SODIUM SERPL-SCNC: 139 MMOL/L — SIGNIFICANT CHANGE UP (ref 135–145)

## 2017-02-06 PROCEDURE — 99239 HOSP IP/OBS DSCHRG MGMT >30: CPT

## 2017-02-06 RX ORDER — MAGNESIUM SULFATE 500 MG/ML
2 VIAL (ML) INJECTION ONCE
Qty: 0 | Refills: 0 | Status: COMPLETED | OUTPATIENT
Start: 2017-02-06 | End: 2017-02-06

## 2017-02-06 RX ORDER — RAMIPRIL 5 MG
1 CAPSULE ORAL
Qty: 0 | Refills: 0 | COMMUNITY
Start: 2017-02-06

## 2017-02-06 RX ORDER — RAMIPRIL 5 MG
0 CAPSULE ORAL
Qty: 0 | Refills: 0 | COMMUNITY

## 2017-02-06 RX ORDER — MAGNESIUM OXIDE 400 MG ORAL TABLET 241.3 MG
1 TABLET ORAL
Qty: 0 | Refills: 0 | COMMUNITY

## 2017-02-06 RX ORDER — NICOTINE POLACRILEX 2 MG
1 GUM BUCCAL
Qty: 30 | Refills: 0 | OUTPATIENT
Start: 2017-02-06 | End: 2017-03-08

## 2017-02-06 RX ADMIN — Medication 1 MILLIGRAM(S): at 06:16

## 2017-02-06 RX ADMIN — Medication 200 MILLIGRAM(S): at 09:26

## 2017-02-06 RX ADMIN — AMLODIPINE BESYLATE 5 MILLIGRAM(S): 2.5 TABLET ORAL at 06:17

## 2017-02-06 RX ADMIN — Medication 50 GRAM(S): at 13:12

## 2017-02-06 RX ADMIN — SODIUM CHLORIDE 3 MILLILITER(S): 9 INJECTION INTRAMUSCULAR; INTRAVENOUS; SUBCUTANEOUS at 05:57

## 2017-02-06 RX ADMIN — PANTOPRAZOLE SODIUM 40 MILLIGRAM(S): 20 TABLET, DELAYED RELEASE ORAL at 06:17

## 2017-02-06 RX ADMIN — MAGNESIUM OXIDE 400 MG ORAL TABLET 400 MILLIGRAM(S): 241.3 TABLET ORAL at 13:12

## 2017-02-06 RX ADMIN — SODIUM CHLORIDE 3 MILLILITER(S): 9 INJECTION INTRAMUSCULAR; INTRAVENOUS; SUBCUTANEOUS at 13:11

## 2017-02-06 RX ADMIN — Medication 325 MILLIGRAM(S): at 13:12

## 2017-02-06 RX ADMIN — LISINOPRIL 20 MILLIGRAM(S): 2.5 TABLET ORAL at 06:17

## 2017-02-06 RX ADMIN — CARVEDILOL PHOSPHATE 25 MILLIGRAM(S): 80 CAPSULE, EXTENDED RELEASE ORAL at 06:17

## 2017-02-06 NOTE — PROGRESS NOTE ADULT - PROBLEM SELECTOR PROBLEM 1
Hypomagnesemia
Acute kidney failure with tubular necrosis
Hypomagnesemia
Acute kidney failure with tubular necrosis

## 2017-02-06 NOTE — PROGRESS NOTE ADULT - PROVIDER SPECIALTY LIST ADULT
Hospitalist
Neurology
Hospitalist
Hospitalist

## 2017-02-06 NOTE — PROGRESS NOTE ADULT - PROBLEM SELECTOR PLAN 3
continue   CIWA   Protocol   , plan  to   tim   Ativan, Neurology   consultation  done
continue   CIWA   Protocol   , plan  to   tim   Ativan
patient follows with Dr. Auguste in rheumatology clinic  stable at present time

## 2017-02-06 NOTE — DISCHARGE NOTE ADULT - HOSPITAL COURSE
37 yo  male etoh abuse has been drinking 1 quart vodka daily except for yesterday when he had 1 pint got oob to go to bathroom and was later found by his wife when she came  home from work lying on back with incontinence of stool no incontinence of urine . event was not witnessed and pt only remembers getting up . he experienced pain to left rib cage upon being awakened no pain to arms legs backside. no pmh of similar event ekg in er sinus rhythm slight ivcd qrs 10.5 otc ok . pt noted with cough white sputum , pt noted l flank pain upon awakening and has large hematoma to l flank ct scan shown no traumatic injury but pt has c/o pleuritic chest pain dyspnea  pmh pf copd active smoker 1 ppd , htn, lupus , born with 1 kidney ,thrombocytopenia , afib  now in sinus, enlarged heart, diverticulosis , psh cataracts throat cystectomy , esophageal stricture dilation x 3 episodes.    Hospital course was complicated by acute alcohol withdrawal delirium which required escalating doses of iv ativan.  patient symptomatically improved and was deemed stable for discharge to follow up with his pmd,  pulmonologist and cardiologist as an outpatient.  He was told to abstain from alcohol and to attend a 12 step program/aa meetings.

## 2017-02-06 NOTE — PROGRESS NOTE ADULT - PROBLEM SELECTOR PLAN 2
continue   cardiac   monitor   , pt   is  on    sinus  rhythm   now
continue   cardiac   monitor   , pt   is  on    sinus  rhythm   now
continue ciwa protocol  ativan taper  seizure precautions
continue ciwa protocol  ativan taper, now down to ativan 3 mg iv q6h  seizure precautions
continue ciwa protocol  ativan taper, now up to ativan 4 mg iv q6h  seizure precautions
s/p ativan taper, doing much better
continue ciwa protocol  ativan taper  seizure precautions

## 2017-02-06 NOTE — DISCHARGE NOTE ADULT - PATIENT PORTAL LINK FT
“You can access the [employer] Patient Portal, offered by [facility], by registering with the following website: [email or web address]”

## 2017-02-06 NOTE — DISCHARGE NOTE ADULT - MEDICATION SUMMARY - MEDICATIONS TO TAKE
I will START or STAY ON the medications listed below when I get home from the hospital:    Altace 10 mg oral capsule  -- 1 cap(s) by mouth once a day  -- Indication: For Hypertension    Zocor 20 mg oral tablet  -- 1 tab(s) by mouth once a day (at bedtime)  -- Indication: For Hypercholesterolemia    Plaquenil 200 mg oral tablet  -- 1 tab(s) by mouth 2 times a day  -- Indication: For Sle    Coreg 25 mg oral tablet  -- 1 tab(s) by mouth 2 times a day  -- Indication: For Paroxysmal atrial fibrillation    Ventolin HFA CFC free 90 mcg/inh inhalation aerosol  -- 2 puff(s) inhaled 4 times a day, As Needed for wheezing  -- For inhalation only.  It is very important that you take or use this exactly as directed.  Do not skip doses or discontinue unless directed by your doctor.  Obtain medical advice before taking any non-prescription drugs as some may affect the action of this medication.  Shake well before use.    -- Indication: For Copd    Norvasc 5 mg oral tablet  -- 1 tab(s) by mouth 2 times a day  -- Indication: For Hypertension    ferrous sulfate 325 mg (65 mg elemental iron) oral delayed release tablet  -- 1 tab(s) by mouth once a day  -- Indication: For Anemia    MiraLax oral powder for reconstitution  --  by mouth once a day  -- Indication: For Constipation    Protonix 40 mg oral delayed release tablet  -- 1 tab(s) by mouth once a day  -- Indication: For gerd    Nicoderm C-Q 14 mg/24 hr transdermal film, extended release  -- 1 patch by transdermal patch once a day  -- Indication: For Smoking cessation

## 2017-02-06 NOTE — PROGRESS NOTE ADULT - PROBLEM SELECTOR PROBLEM 4
Paroxysmal atrial fibrillation

## 2017-02-06 NOTE — PROGRESS NOTE ADULT - SUBJECTIVE AND OBJECTIVE BOX
Patient is a 36y old  Male who presents with a chief complaint of passed out found on floor by wife (30 Jan 2017 17:30)      INTERVAL HPI/OVERNIGHT EVENTS:  patient much more calm.  Notices some chest discomfort.  No fever or chills  No tremor.  A+O x 4.    MEDICATIONS  (STANDING):  sodium chloride 0.9% lock flush 3milliLiter(s) IV Push every 8 hours  simvastatin 20milliGRAM(s) Oral at bedtime  hydroxychloroquine 200milliGRAM(s) Oral two times a day with meals  carvedilol 25milliGRAM(s) Oral every 12 hours  amLODIPine   Tablet 5milliGRAM(s) Oral two times a day  pantoprazole    Tablet 40milliGRAM(s) Oral before breakfast  lisinopril 20milliGRAM(s) Oral two times a day  ferrous    sulfate 325milliGRAM(s) Oral daily  magnesium oxide 400milliGRAM(s) Oral daily  nicotine -  14 mG/24Hr(s) Patch 1patch Transdermal daily  LORazepam   Injectable milliGRAM(s) IV Push   LORazepam   Injectable 1milliGRAM(s) IV Push every 12 hours  enoxaparin Injectable 40milliGRAM(s) SubCutaneous every 24 hours    MEDICATIONS  (PRN):  ALBUTerol    90 MICROgram(s) HFA Inhaler 2Puff(s) Inhalation every 6 hours PRN Shortness of Breath and/or Wheezing  LORazepam     Tablet 2milliGRAM(s) Oral every 2 hours PRN Symptom-triggered 2 point increase in CIWA-Ar  LORazepam   Injectable 2milliGRAM(s) IV Push every 1 hour PRN Symptom-triggered: each CIWA -Ar score 8 or GREATER  nicotine  Polacrilex Gum 4milliGRAM(s) Oral every 4 hours PRN cravings      Allergies    sulfa drugs (Unknown)    Intolerances        REVIEW OF SYSTEMS:  CONSTITUTIONAL: No fever, weight loss, or fatigue  EYES: No eye pain, visual disturbances, or discharge  ENMT:  No difficulty hearing, tinnitus, vertigo; No sinus or throat pain  NECK: No pain or stiffness  BREASTS: No pain, masses, or nipple discharge  RESPIRATORY: No cough, wheezing, chills or hemoptysis; No shortness of breath  CARDIOVASCULAR: No chest pain, palpitations, dizziness, or leg swelling  GASTROINTESTINAL:no abdominal pain.  no nausea, no vomiting  GENITOURINARY: No dysuria, frequency, hematuria, or incontinence  NEUROLOGICAL: No headaches, memory loss, loss of strength, numbness, or tremors  SKIN: No itching, burning, rashes, or lesions   LYMPH NODES: No enlarged glands  ENDOCRINE: No heat or cold intolerance; No hair loss  MUSCULOSKELETAL: No joint pain or swelling; No muscle, back, or extremity pain  PSYCHIATRIC: No depression difficulty sleeping  HEME/LYMPH: No easy bruising, or bleeding gums  ALLERGY AND IMMUNOLOGIC: No hives or eczema    Vital Signs Last 24 Hrs  T(C): 36.4, Max: 37 (02-05 @ 17:28)  T(F): 97.6, Max: 98.6 (02-05 @ 17:28)  HR: 87 (87 - 97)  BP: 110/60 (99/55 - 120/74)  BP(mean): --  RR: 18 (16 - 18)  SpO2: 97% (92% - 97%)    PHYSICAL EXAM:  GENERAL: NAD, sitting in bed  HEAD:  Atraumatic, Normocephalic  EYES: EOMI, PERRLA, conjunctiva and sclera clear  ENMT: No tonsillar erythema, exudates, or enlargement; Moist mucous membranes, Good dentition, No lesions  NECK: Supple, No JVD  NERVOUS SYSTEM:  Alert & Oriented X3, Good concentration; Motor Strength 5/5 B/L upper and lower extremities;  no tremor  CHEST/LUNG: Clear to auscultation bilaterally; No rales, rhonchi, wheezing, or rubs  HEART: Regular rate and rhythm; No murmurs, rubs, or gallops  ABDOMEN: Soft, Nontender, Nondistended; Bowel sounds present  EXTREMITIES:  2+ Peripheral Pulses, No clubbing, cyanosis, or edema  LYMPH: No lymphadenopathy noted  SKIN: No rashes or lesions    LABS:                        12.3   3.9   )-----------( 174      ( 05 Feb 2017 07:11 )             36.9     06 Feb 2017 06:38    139    |  104    |  15     ----------------------------<  78     4.1     |  27     |  1.19     Ca    8.0        06 Feb 2017 06:38  Mg     1.7       06 Feb 2017 06:38          CAPILLARY BLOOD GLUCOSE      RADIOLOGY & ADDITIONAL TESTS:    Imaging Personally Reviewed:  [ ] YES  [ ] NO    Consultant(s) Notes Reviewed:  [ ] YES  [ ] NO    Care Discussed with Consultants/Other Providers [ ] YES  [ ] NO

## 2017-02-06 NOTE — PROGRESS NOTE ADULT - PROBLEM SELECTOR PLAN 1
Mg    Level    1.5   , Mg    replacement   , repeat   MG   Level, monitor   electrolyte
Mg    Suppliment  , repeat   MG   Level
likely secondary to dehydration; creatinine improving with hydration  continue iv hydration with ns at 75 cc/hr  avoid nephrotoxins, check am labs
likely secondary to dehydration; creatinine now at baseline  avoid nephrotoxins, check am labs  trend bmp intermittently
likely secondary to dehydration; creatinine now at baseline  discontinue iv hydration  avoid nephrotoxins, check am labs
likely secondary to dehydration; resolved
likely secondary to dehydration  continue iv hydration with ns at 75 cc/hr  avoid nephrotoxins, check am labs

## 2017-02-06 NOTE — PROGRESS NOTE ADULT - PROBLEM SELECTOR PROBLEM 3
Alcohol withdrawal seizure without complication
Other forms of systemic lupus erythematosus, unspecified organ involvement status
Alcohol withdrawal seizure without complication
Other forms of systemic lupus erythematosus, unspecified organ involvement status

## 2017-02-06 NOTE — PROGRESS NOTE ADULT - PROBLEM SELECTOR PLAN 6
give magnesium 2 g iv today  likely secondary to chronic etoh use
supplement, check am labs  likely secondary to chronic etoh use

## 2017-02-06 NOTE — DISCHARGE NOTE ADULT - SECONDARY DIAGNOSIS.
Acute kidney failure with tubular necrosis Chronic obstructive pulmonary disease, unspecified COPD type Essential hypertension ETOH abuse Hypokalemia Hypomagnesemia

## 2017-02-06 NOTE — DISCHARGE NOTE ADULT - MEDICATION SUMMARY - MEDICATIONS TO CHANGE
I will SWITCH the dose or number of times a day I take the medications listed below when I get home from the hospital:    Altace 5 mg oral capsule  -- 1 cap(s) by mouth once a day    Altace 5 mg oral capsule  --  by mouth 2 times a day

## 2017-02-06 NOTE — PROGRESS NOTE ADULT - PROBLEM SELECTOR PLAN 5
resolved s/p supplementation
supplement aggressively  cont tele for now  check am labs

## 2017-02-06 NOTE — PROGRESS NOTE ADULT - PROBLEM SELECTOR PLAN 4
patient is currently in sinus rhythm --d/c tele
patient is currently in sinus rhythm continue tele for now (on iv ativan)
patient is currently in sinus rhythm, and a little tachycardic, likely secondary to agitation
patient is currently in sinus rhythm, and a little tachycardic.
patient is currently in sinus rhythm, and a little tachycardic.

## 2017-02-06 NOTE — DISCHARGE NOTE ADULT - CARE PLAN
Principal Discharge DX:	Alcohol withdrawal seizure with complication, with delirium  Goal:	abstain from alcohol, join 12 step program  Instructions for follow-up, activity and diet:	get involved in outpatient rehab  Secondary Diagnosis:	Acute kidney failure with tubular necrosis  Secondary Diagnosis:	Chronic obstructive pulmonary disease, unspecified COPD type  Secondary Diagnosis:	Essential hypertension  Secondary Diagnosis:	ETOH abuse  Secondary Diagnosis:	Hypokalemia  Secondary Diagnosis:	Hypomagnesemia

## 2017-02-06 NOTE — PROGRESS NOTE ADULT - PROBLEM SELECTOR PROBLEM 2
Paroxysmal atrial fibrillation
Alcohol withdrawal seizure without complication
Paroxysmal atrial fibrillation
Alcohol withdrawal seizure without complication

## 2017-02-06 NOTE — DISCHARGE NOTE ADULT - MEDICATION SUMMARY - MEDICATIONS TO STOP TAKING
I will STOP taking the medications listed below when I get home from the hospital:    magnesium oxide 400 mg (241.3 mg elemental magnesium) oral tablet  -- 1 tab(s) by mouth once a day

## 2017-02-08 DIAGNOSIS — E87.6 HYPOKALEMIA: ICD-10-CM

## 2017-02-08 DIAGNOSIS — K57.92 DIVERTICULITIS OF INTESTINE, PART UNSPECIFIED, WITHOUT PERFORATION OR ABSCESS WITHOUT BLEEDING: ICD-10-CM

## 2017-02-08 DIAGNOSIS — M32.9 SYSTEMIC LUPUS ERYTHEMATOSUS, UNSPECIFIED: ICD-10-CM
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
## 2017-02-08 DIAGNOSIS — I51.7 CARDIOMEGALY: ICD-10-CM

## 2017-02-08 DIAGNOSIS — J44.9 CHRONIC OBSTRUCTIVE PULMONARY DISEASE, UNSPECIFIED: ICD-10-CM

## 2017-02-08 DIAGNOSIS — D69.6 THROMBOCYTOPENIA, UNSPECIFIED: ICD-10-CM

## 2017-02-08 DIAGNOSIS — G40.509 EPILEPTIC SEIZURES RELATED TO EXTERNAL CAUSES, NOT INTRACTABLE, WITHOUT STATUS EPILEPTICUS: ICD-10-CM

## 2017-02-08 DIAGNOSIS — N17.0 ACUTE KIDNEY FAILURE WITH TUBULAR NECROSIS: ICD-10-CM

## 2017-02-08 DIAGNOSIS — F10.231 ALCOHOL DEPENDENCE WITH WITHDRAWAL DELIRIUM: ICD-10-CM

## 2017-02-08 DIAGNOSIS — I38 ENDOCARDITIS, VALVE UNSPECIFIED: ICD-10-CM
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
## 2017-02-08 DIAGNOSIS — Z88.2 ALLERGY STATUS TO SULFONAMIDES: ICD-10-CM

## 2017-02-08 DIAGNOSIS — E83.42 HYPOMAGNESEMIA: ICD-10-CM

## 2017-02-08 DIAGNOSIS — Q60.0 RENAL AGENESIS, UNILATERAL: ICD-10-CM

## 2017-02-08 DIAGNOSIS — R55 SYNCOPE AND COLLAPSE: ICD-10-CM
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
## 2017-02-08 DIAGNOSIS — S20.212A CONTUSION OF LEFT FRONT WALL OF THORAX, INITIAL ENCOUNTER: ICD-10-CM

## 2017-02-08 DIAGNOSIS — F17.210 NICOTINE DEPENDENCE, CIGARETTES, UNCOMPLICATED: ICD-10-CM

## 2017-02-08 DIAGNOSIS — I48.0 PAROXYSMAL ATRIAL FIBRILLATION: ICD-10-CM

## 2017-02-08 DIAGNOSIS — R26.81 UNSTEADINESS ON FEET: ICD-10-CM

## 2017-02-14 ENCOUNTER — EMERGENCY (EMERGENCY)
Facility: HOSPITAL | Age: 37
LOS: 0 days | Discharge: HOME | End: 2017-02-14
Attending: EMERGENCY MEDICINE
Payer: COMMERCIAL

## 2017-02-14 VITALS
WEIGHT: 250 LBS | HEART RATE: 125 BPM | DIASTOLIC BLOOD PRESSURE: 100 MMHG | SYSTOLIC BLOOD PRESSURE: 147 MMHG | HEIGHT: 70 IN | TEMPERATURE: 97 F | RESPIRATION RATE: 16 BRPM | OXYGEN SATURATION: 100 %

## 2017-02-14 VITALS
OXYGEN SATURATION: 100 % | HEART RATE: 98 BPM | RESPIRATION RATE: 18 BRPM | DIASTOLIC BLOOD PRESSURE: 90 MMHG | SYSTOLIC BLOOD PRESSURE: 141 MMHG | TEMPERATURE: 99 F

## 2017-02-14 DIAGNOSIS — Z72.0 TOBACCO USE: ICD-10-CM

## 2017-02-14 DIAGNOSIS — E66.8 OTHER OBESITY: ICD-10-CM

## 2017-02-14 DIAGNOSIS — H59.022 CATARACT (LENS) FRAGMENTS IN EYE FOLLOWING CATARACT SURGERY, LEFT EYE: Chronic | ICD-10-CM

## 2017-02-14 DIAGNOSIS — Z98.89 OTHER SPECIFIED POSTPROCEDURAL STATES: Chronic | ICD-10-CM

## 2017-02-14 DIAGNOSIS — D69.6 THROMBOCYTOPENIA, UNSPECIFIED: ICD-10-CM

## 2017-02-14 DIAGNOSIS — J44.9 CHRONIC OBSTRUCTIVE PULMONARY DISEASE, UNSPECIFIED: ICD-10-CM

## 2017-02-14 DIAGNOSIS — F10.129 ALCOHOL ABUSE WITH INTOXICATION, UNSPECIFIED: ICD-10-CM

## 2017-02-14 DIAGNOSIS — I51.7 CARDIOMEGALY: ICD-10-CM

## 2017-02-14 DIAGNOSIS — Z79.51 LONG TERM (CURRENT) USE OF INHALED STEROIDS: ICD-10-CM

## 2017-02-14 DIAGNOSIS — I38 ENDOCARDITIS, VALVE UNSPECIFIED: ICD-10-CM

## 2017-02-14 DIAGNOSIS — I48.91 UNSPECIFIED ATRIAL FIBRILLATION: ICD-10-CM

## 2017-02-14 DIAGNOSIS — Z88.2 ALLERGY STATUS TO SULFONAMIDES: ICD-10-CM

## 2017-02-14 DIAGNOSIS — K57.92 DIVERTICULITIS OF INTESTINE, PART UNSPECIFIED, WITHOUT PERFORATION OR ABSCESS WITHOUT BLEEDING: ICD-10-CM

## 2017-02-14 DIAGNOSIS — N28.9 DISORDER OF KIDNEY AND URETER, UNSPECIFIED: ICD-10-CM

## 2017-02-14 LAB
ALBUMIN SERPL ELPH-MCNC: 3 G/DL — LOW (ref 3.3–5)
ALP SERPL-CCNC: 96 U/L — SIGNIFICANT CHANGE UP (ref 40–120)
ALT FLD-CCNC: 29 U/L — SIGNIFICANT CHANGE UP (ref 12–78)
AMPHET UR-MCNC: NEGATIVE — SIGNIFICANT CHANGE UP
ANION GAP SERPL CALC-SCNC: 13 MMOL/L — SIGNIFICANT CHANGE UP (ref 5–17)
APTT BLD: 51.6 SEC — HIGH (ref 27.5–37.4)
AST SERPL-CCNC: 39 U/L — HIGH (ref 15–37)
BARBITURATES UR SCN-MCNC: NEGATIVE — SIGNIFICANT CHANGE UP
BASOPHILS # BLD AUTO: 0.1 K/UL — SIGNIFICANT CHANGE UP (ref 0–0.2)
BASOPHILS NFR BLD AUTO: 1.2 % — SIGNIFICANT CHANGE UP (ref 0–2)
BENZODIAZ UR-MCNC: NEGATIVE — SIGNIFICANT CHANGE UP
BILIRUB SERPL-MCNC: 0.3 MG/DL — SIGNIFICANT CHANGE UP (ref 0.2–1.2)
BUN SERPL-MCNC: 14 MG/DL — SIGNIFICANT CHANGE UP (ref 7–23)
CALCIUM SERPL-MCNC: 8.2 MG/DL — LOW (ref 8.5–10.1)
CHLORIDE SERPL-SCNC: 101 MMOL/L — SIGNIFICANT CHANGE UP (ref 96–108)
CK MB BLD-MCNC: 0.9 % — SIGNIFICANT CHANGE UP (ref 0–3.5)
CK MB CFR SERPL CALC: 1.4 NG/ML — SIGNIFICANT CHANGE UP (ref 0.5–3.6)
CK SERPL-CCNC: 148 U/L — SIGNIFICANT CHANGE UP (ref 26–308)
CO2 SERPL-SCNC: 25 MMOL/L — SIGNIFICANT CHANGE UP (ref 22–31)
COCAINE METAB.OTHER UR-MCNC: NEGATIVE — SIGNIFICANT CHANGE UP
CREAT SERPL-MCNC: 0.94 MG/DL — SIGNIFICANT CHANGE UP (ref 0.5–1.3)
EOSINOPHIL # BLD AUTO: 0.1 K/UL — SIGNIFICANT CHANGE UP (ref 0–0.5)
EOSINOPHIL NFR BLD AUTO: 1.6 % — SIGNIFICANT CHANGE UP (ref 0–6)
ETHANOL SERPL-MCNC: SIGNIFICANT CHANGE UP MG/DL (ref 0–10)
GLUCOSE SERPL-MCNC: 77 MG/DL — SIGNIFICANT CHANGE UP (ref 70–99)
HCT VFR BLD CALC: 38.8 % — LOW (ref 39–50)
HGB BLD-MCNC: 12.8 G/DL — LOW (ref 13–17)
INR BLD: 1.03 RATIO — SIGNIFICANT CHANGE UP (ref 0.88–1.16)
LYMPHOCYTES # BLD AUTO: 1.4 K/UL — SIGNIFICANT CHANGE UP (ref 1–3.3)
LYMPHOCYTES # BLD AUTO: 25.2 % — SIGNIFICANT CHANGE UP (ref 13–44)
MAGNESIUM SERPL-MCNC: 1.7 MG/DL — LOW (ref 1.8–2.4)
MCHC RBC-ENTMCNC: 27.6 PG — SIGNIFICANT CHANGE UP (ref 27–34)
MCHC RBC-ENTMCNC: 33.1 GM/DL — SIGNIFICANT CHANGE UP (ref 32–36)
MCV RBC AUTO: 83.4 FL — SIGNIFICANT CHANGE UP (ref 80–100)
METHADONE UR-MCNC: NEGATIVE — SIGNIFICANT CHANGE UP
MONOCYTES # BLD AUTO: 0.6 K/UL — SIGNIFICANT CHANGE UP (ref 0–0.9)
MONOCYTES NFR BLD AUTO: 10.2 % — SIGNIFICANT CHANGE UP (ref 2–14)
NEUTROPHILS # BLD AUTO: 3.3 K/UL — SIGNIFICANT CHANGE UP (ref 1.8–7.4)
NEUTROPHILS NFR BLD AUTO: 61.8 % — SIGNIFICANT CHANGE UP (ref 43–77)
OPIATES UR-MCNC: NEGATIVE — SIGNIFICANT CHANGE UP
PCP SPEC-MCNC: SIGNIFICANT CHANGE UP
PCP UR-MCNC: NEGATIVE — SIGNIFICANT CHANGE UP
PHOSPHATE SERPL-MCNC: 2.8 MG/DL — SIGNIFICANT CHANGE UP (ref 2.5–4.5)
PLATELET # BLD AUTO: 429 K/UL — HIGH (ref 150–400)
POTASSIUM SERPL-MCNC: 3.7 MMOL/L — SIGNIFICANT CHANGE UP (ref 3.5–5.3)
POTASSIUM SERPL-SCNC: 3.7 MMOL/L — SIGNIFICANT CHANGE UP (ref 3.5–5.3)
PROT SERPL-MCNC: 8.7 GM/DL — HIGH (ref 6–8.3)
PROTHROM AB SERPL-ACNC: 11.5 SEC — SIGNIFICANT CHANGE UP (ref 10–13.1)
RBC # BLD: 4.65 M/UL — SIGNIFICANT CHANGE UP (ref 4.2–5.8)
RBC # FLD: 18.3 % — HIGH (ref 11–15)
SODIUM SERPL-SCNC: 139 MMOL/L — SIGNIFICANT CHANGE UP (ref 135–145)
THC UR QL: POSITIVE — SIGNIFICANT CHANGE UP
TROPONIN I SERPL-MCNC: <.015 NG/ML — SIGNIFICANT CHANGE UP (ref 0.01–0.04)
WBC # BLD: 5.4 K/UL — SIGNIFICANT CHANGE UP (ref 3.8–10.5)
WBC # FLD AUTO: 5.4 K/UL — SIGNIFICANT CHANGE UP (ref 3.8–10.5)

## 2017-02-14 PROCEDURE — 71010: CPT | Mod: 26

## 2017-02-14 PROCEDURE — 99285 EMERGENCY DEPT VISIT HI MDM: CPT

## 2017-02-14 RX ORDER — FAMOTIDINE 10 MG/ML
20 INJECTION INTRAVENOUS ONCE
Qty: 0 | Refills: 0 | Status: COMPLETED | OUTPATIENT
Start: 2017-02-14 | End: 2017-02-14

## 2017-02-14 RX ORDER — SODIUM CHLORIDE 9 MG/ML
1000 INJECTION, SOLUTION INTRAVENOUS
Qty: 0 | Refills: 0 | Status: COMPLETED | OUTPATIENT
Start: 2017-02-14 | End: 2017-02-14

## 2017-02-14 RX ORDER — SODIUM CHLORIDE 9 MG/ML
3 INJECTION INTRAMUSCULAR; INTRAVENOUS; SUBCUTANEOUS EVERY 8 HOURS
Qty: 0 | Refills: 0 | Status: DISCONTINUED | OUTPATIENT
Start: 2017-02-14 | End: 2017-02-14

## 2017-02-14 RX ADMIN — SODIUM CHLORIDE 200 MILLILITER(S): 9 INJECTION, SOLUTION INTRAVENOUS at 14:42

## 2017-02-14 RX ADMIN — FAMOTIDINE 20 MILLIGRAM(S): 10 INJECTION INTRAVENOUS at 17:05

## 2017-02-14 NOTE — ED ADULT TRIAGE NOTE - CHIEF COMPLAINT QUOTE
Girlfriend called for excessive alcohol use age, denies falling but not able to ambulate, Afib, lupus

## 2017-02-14 NOTE — ED ADULT NURSE NOTE - OBJECTIVE STATEMENT
pt received in stretcher, alert and co operative, pt admits to drinking 1 pint vodka from this morning . pt states he does not know how to function without alcohol. pt fully undressed property done and locked up with security. pt admits to productive cough with neon yellow sputum

## 2017-02-14 NOTE — ED ADULT NURSE NOTE - PMH
A-fib    Chronic obstructive pulmonary disease, unspecified COPD type    Diverticulitis    Enlarged heart    Heart valve problem  leaky  Kidney problem  born with 1 kidney, right side  Platelets decreased    Tobacco abuse

## 2017-02-14 NOTE — ED PROVIDER NOTE - MEDICAL DECISION MAKING DETAILS
ETOH level 376.  Remainder of w/u is unremarkable.  Pt given banana bag, pepcid, observed for 5 hours with no concerning events.  Pt much more alert, coherent.  Denies SI, HI, AVH or paranoia.  No signs of withdrawal.  Pt disheveled but well appearing with stable vitals.  Pt's wife at bedside, to take pt home.  Admission to hospital not currently indicated.  Given list of ETOH treatment centers.

## 2017-02-14 NOTE — ED PROVIDER NOTE - OBJECTIVE STATEMENT
Pertinent PMH/PSH/FHx/SHx and Review of Systems contained within:    35 y/o M with h/o Afib not on AC, SLE, ETOH abuse, COPD not on home O2, heavy smoker (2.5ppd), 1 kidney, thrombocytopenia, diverticulosis, obesity, esophageal stricture brought by EMS after girlfriend called 911 after argument.  Pt c/o trouble breathing for 5 days with cough productive of white sputum.  Pt drinks 1 bottle of vodka daily, last drink this morning.  Recent d/c from here 6 days ago, required ativan for withdrawal.    No fever/chills, No headache, No photophobia/eye pain/changes in vision, No ear pain/sore throat/dysphagia, No chest pain/palpitations, No abdominal pain, No N/V/D, No dysuria/frequency/discharge, No neck/back pain, No rash, No lower extremity edema, No changes in neurological status/function.    No other pertinent PMH.  No other pertinent PSH.  No other pertinent FHx.  Patient denies EtOH/tobacco/illicit substance use. Pertinent PMH/PSH/FHx/SHx and Review of Systems contained within:    35 y/o M with h/o Afib not on AC, SLE, ETOH abuse, COPD not on home O2, heavy smoker (2.5ppd), 1 kidney, thrombocytopenia, diverticulosis, obesity, esophageal stricture brought by EMS after wife called 911 after argument.  Pt c/o trouble breathing for 5 days with cough productive of white sputum.  Pt drinks 1 bottle of vodka daily, last drink this morning.  Recent d/c from here 6 days ago, required ativan for withdrawal.    No fever/chills, No headache, No photophobia/eye pain/changes in vision, No ear pain/sore throat/dysphagia, No chest pain/palpitations, No abdominal pain, No N/V/D, No dysuria/frequency/discharge, No neck/back pain, No rash, No lower extremity edema, No changes in neurological status/function.    No other pertinent PMH.  No other pertinent PSH.  No other pertinent FHx.  Patient denies EtOH/tobacco/illicit substance use.

## 2017-02-14 NOTE — ED PROVIDER NOTE - PHYSICAL EXAMINATION
Gen: Alert, NAD, disheveled, obese, agitated                  Head: NC, AT, PERRL, EOMI, normal lids/conjunctiva, no icterus               ENT: normal hearing, patent oropharynx without erythema/exudate, uvula midline, moist mucosa                         Neck: supple, no tenderness/meningismus/JVD, trachea midline                   Pulm: Bilateral BS, normal resp effort, no wheeze/stridor/retractions/rales/rhonchi                      CV: RRR, no M/R/G, good dist pulses                Abd: soft, NT/ND, no hepatosplenomegaly, obese, no caput medusae                   Mskel: no edema/erythema/cyanosis                Skin: no rash                 Neuro: AAOx3, no sensory/motor deficits, no asterixis                       Back: No tenderness

## 2017-09-06 ENCOUNTER — INPATIENT (INPATIENT)
Facility: HOSPITAL | Age: 37
LOS: 5 days | Discharge: LEFT BEFORE DISCHARGE | End: 2017-09-12
Attending: INTERNAL MEDICINE | Admitting: INTERNAL MEDICINE
Payer: COMMERCIAL

## 2017-09-06 VITALS
DIASTOLIC BLOOD PRESSURE: 50 MMHG | WEIGHT: 250 LBS | RESPIRATION RATE: 18 BRPM | OXYGEN SATURATION: 93 % | HEART RATE: 91 BPM | TEMPERATURE: 98 F | SYSTOLIC BLOOD PRESSURE: 74 MMHG | HEIGHT: 70 IN

## 2017-09-06 DIAGNOSIS — H59.022 CATARACT (LENS) FRAGMENTS IN EYE FOLLOWING CATARACT SURGERY, LEFT EYE: Chronic | ICD-10-CM

## 2017-09-06 DIAGNOSIS — N17.9 ACUTE KIDNEY FAILURE, UNSPECIFIED: ICD-10-CM

## 2017-09-06 DIAGNOSIS — J44.9 CHRONIC OBSTRUCTIVE PULMONARY DISEASE, UNSPECIFIED: ICD-10-CM

## 2017-09-06 DIAGNOSIS — M62.82 RHABDOMYOLYSIS: ICD-10-CM

## 2017-09-06 DIAGNOSIS — Z72.0 TOBACCO USE: ICD-10-CM

## 2017-09-06 DIAGNOSIS — R79.89 OTHER SPECIFIED ABNORMAL FINDINGS OF BLOOD CHEMISTRY: ICD-10-CM

## 2017-09-06 DIAGNOSIS — F10.10 ALCOHOL ABUSE, UNCOMPLICATED: ICD-10-CM

## 2017-09-06 DIAGNOSIS — Z98.89 OTHER SPECIFIED POSTPROCEDURAL STATES: Chronic | ICD-10-CM

## 2017-09-06 DIAGNOSIS — E87.6 HYPOKALEMIA: ICD-10-CM

## 2017-09-06 DIAGNOSIS — N17.0 ACUTE KIDNEY FAILURE WITH TUBULAR NECROSIS: ICD-10-CM

## 2017-09-06 DIAGNOSIS — M32.9 SYSTEMIC LUPUS ERYTHEMATOSUS, UNSPECIFIED: ICD-10-CM

## 2017-09-06 DIAGNOSIS — I48.91 UNSPECIFIED ATRIAL FIBRILLATION: ICD-10-CM

## 2017-09-06 LAB
ALBUMIN SERPL ELPH-MCNC: 2 G/DL — LOW (ref 3.3–5)
ALP SERPL-CCNC: 215 U/L — HIGH (ref 40–120)
ALT FLD-CCNC: 262 U/L — HIGH (ref 12–78)
AMPHET UR-MCNC: NEGATIVE — SIGNIFICANT CHANGE UP
ANION GAP SERPL CALC-SCNC: 13 MMOL/L — SIGNIFICANT CHANGE UP (ref 5–17)
ANION GAP SERPL CALC-SCNC: 14 MMOL/L — SIGNIFICANT CHANGE UP (ref 5–17)
ANION GAP SERPL CALC-SCNC: 15 MMOL/L — SIGNIFICANT CHANGE UP (ref 5–17)
APPEARANCE UR: CLEAR — SIGNIFICANT CHANGE UP
APTT BLD: 28.2 SEC — SIGNIFICANT CHANGE UP (ref 27.5–37.4)
AST SERPL-CCNC: 530 U/L — HIGH (ref 15–37)
BARBITURATES UR SCN-MCNC: NEGATIVE — SIGNIFICANT CHANGE UP
BASE EXCESS BLDA CALC-SCNC: 8.6 MMOL/L — HIGH (ref -2–2)
BASOPHILS # BLD AUTO: 0.1 K/UL — SIGNIFICANT CHANGE UP (ref 0–0.2)
BASOPHILS NFR BLD AUTO: 2.6 % — HIGH (ref 0–2)
BENZODIAZ UR-MCNC: NEGATIVE — SIGNIFICANT CHANGE UP
BILIRUB SERPL-MCNC: 0.8 MG/DL — SIGNIFICANT CHANGE UP (ref 0.2–1.2)
BILIRUB UR-MCNC: NEGATIVE — SIGNIFICANT CHANGE UP
BLD GP AB SCN SERPL QL: SIGNIFICANT CHANGE UP
BLOOD GAS COMMENTS: SIGNIFICANT CHANGE UP
BLOOD GAS SOURCE: SIGNIFICANT CHANGE UP
BUN SERPL-MCNC: 42 MG/DL — HIGH (ref 7–23)
BUN SERPL-MCNC: 42 MG/DL — HIGH (ref 7–23)
BUN SERPL-MCNC: 46 MG/DL — HIGH (ref 7–23)
CALCIUM SERPL-MCNC: 5.7 MG/DL — CRITICAL LOW (ref 8.5–10.1)
CALCIUM SERPL-MCNC: 6.2 MG/DL — CRITICAL LOW (ref 8.5–10.1)
CALCIUM SERPL-MCNC: 6.2 MG/DL — CRITICAL LOW (ref 8.5–10.1)
CHLORIDE SERPL-SCNC: 87 MMOL/L — LOW (ref 96–108)
CHLORIDE SERPL-SCNC: 95 MMOL/L — LOW (ref 96–108)
CHLORIDE SERPL-SCNC: 97 MMOL/L — SIGNIFICANT CHANGE UP (ref 96–108)
CK MB BLD-MCNC: <0 % — SIGNIFICANT CHANGE UP (ref 0–3.5)
CK MB CFR SERPL CALC: 6.7 NG/ML — HIGH (ref 0.5–3.6)
CK SERPL-CCNC: HIGH U/L (ref 26–308)
CO2 SERPL-SCNC: 30 MMOL/L — SIGNIFICANT CHANGE UP (ref 22–31)
CO2 SERPL-SCNC: 31 MMOL/L — SIGNIFICANT CHANGE UP (ref 22–31)
CO2 SERPL-SCNC: 36 MMOL/L — HIGH (ref 22–31)
COCAINE METAB.OTHER UR-MCNC: NEGATIVE — SIGNIFICANT CHANGE UP
COLOR SPEC: YELLOW — SIGNIFICANT CHANGE UP
CREAT ?TM UR-MCNC: 241 MG/DL — SIGNIFICANT CHANGE UP
CREAT SERPL-MCNC: 2.42 MG/DL — HIGH (ref 0.5–1.3)
CREAT SERPL-MCNC: 2.6 MG/DL — HIGH (ref 0.5–1.3)
CREAT SERPL-MCNC: 3.09 MG/DL — HIGH (ref 0.5–1.3)
DIFF PNL FLD: ABNORMAL
EOSINOPHIL # BLD AUTO: 0.1 K/UL — SIGNIFICANT CHANGE UP (ref 0–0.5)
EOSINOPHIL NFR BLD AUTO: 1.9 % — SIGNIFICANT CHANGE UP (ref 0–6)
EPI CELLS # UR: SIGNIFICANT CHANGE UP
ETHANOL SERPL-MCNC: 163 MG/DL — HIGH (ref 0–10)
GLUCOSE SERPL-MCNC: 117 MG/DL — HIGH (ref 70–99)
GLUCOSE SERPL-MCNC: 76 MG/DL — SIGNIFICANT CHANGE UP (ref 70–99)
GLUCOSE SERPL-MCNC: 77 MG/DL — SIGNIFICANT CHANGE UP (ref 70–99)
GLUCOSE UR QL: NEGATIVE MG/DL — SIGNIFICANT CHANGE UP
HCO3 BLDA-SCNC: 32 MMOL/L — HIGH (ref 21–29)
HCT VFR BLD CALC: 36.5 % — LOW (ref 39–50)
HGB BLD-MCNC: 12.2 G/DL — LOW (ref 13–17)
HOROWITZ INDEX BLDA+IHG-RTO: 36 — SIGNIFICANT CHANGE UP
INR BLD: 1.08 RATIO — SIGNIFICANT CHANGE UP (ref 0.88–1.16)
KETONES UR-MCNC: NEGATIVE — SIGNIFICANT CHANGE UP
LACTATE SERPL-SCNC: 2.4 MMOL/L — HIGH (ref 0.7–2)
LACTATE SERPL-SCNC: 4.3 MMOL/L — CRITICAL HIGH (ref 0.7–2)
LEUKOCYTE ESTERASE UR-ACNC: ABNORMAL
LYMPHOCYTES # BLD AUTO: 0.6 K/UL — LOW (ref 1–3.3)
LYMPHOCYTES # BLD AUTO: 11 % — LOW (ref 13–44)
MAGNESIUM SERPL-MCNC: 0.8 MG/DL — CRITICAL LOW (ref 1.6–2.6)
MCHC RBC-ENTMCNC: 33.2 PG — SIGNIFICANT CHANGE UP (ref 27–34)
MCHC RBC-ENTMCNC: 33.6 GM/DL — SIGNIFICANT CHANGE UP (ref 32–36)
MCV RBC AUTO: 98.9 FL — SIGNIFICANT CHANGE UP (ref 80–100)
METHADONE UR-MCNC: NEGATIVE — SIGNIFICANT CHANGE UP
MONOCYTES # BLD AUTO: 0.7 K/UL — SIGNIFICANT CHANGE UP (ref 0–0.9)
MONOCYTES NFR BLD AUTO: 11.5 % — SIGNIFICANT CHANGE UP (ref 2–14)
NEUTROPHILS # BLD AUTO: 4.2 K/UL — SIGNIFICANT CHANGE UP (ref 1.8–7.4)
NEUTROPHILS NFR BLD AUTO: 73 % — SIGNIFICANT CHANGE UP (ref 43–77)
NITRITE UR-MCNC: NEGATIVE — SIGNIFICANT CHANGE UP
NT-PROBNP SERPL-SCNC: 611 PG/ML — HIGH (ref 0–125)
OPIATES UR-MCNC: NEGATIVE — SIGNIFICANT CHANGE UP
PCO2 BLDA: 39 MMHG — SIGNIFICANT CHANGE UP (ref 32–46)
PCP SPEC-MCNC: SIGNIFICANT CHANGE UP
PCP UR-MCNC: NEGATIVE — SIGNIFICANT CHANGE UP
PH BLD: 7.52 — HIGH (ref 7.35–7.45)
PH UR: 5 — SIGNIFICANT CHANGE UP (ref 5–8)
PHOSPHATE SERPL-MCNC: 3.9 MG/DL — SIGNIFICANT CHANGE UP (ref 2.5–4.5)
PLATELET # BLD AUTO: 146 K/UL — LOW (ref 150–400)
PO2 BLDA: 58 MMHG — LOW (ref 74–108)
POTASSIUM SERPL-MCNC: 2.1 MMOL/L — CRITICAL LOW (ref 3.5–5.3)
POTASSIUM SERPL-MCNC: 2.4 MMOL/L — CRITICAL LOW (ref 3.5–5.3)
POTASSIUM SERPL-MCNC: 2.8 MMOL/L — CRITICAL LOW (ref 3.5–5.3)
POTASSIUM SERPL-SCNC: 2.1 MMOL/L — CRITICAL LOW (ref 3.5–5.3)
POTASSIUM SERPL-SCNC: 2.4 MMOL/L — CRITICAL LOW (ref 3.5–5.3)
POTASSIUM SERPL-SCNC: 2.8 MMOL/L — CRITICAL LOW (ref 3.5–5.3)
PROT ?TM UR-MCNC: 46 MG/DL — HIGH (ref 0–12)
PROT SERPL-MCNC: 6.5 GM/DL — SIGNIFICANT CHANGE UP (ref 6–8.3)
PROT UR-MCNC: 15 MG/DL
PROT/CREAT UR-RTO: 0.2 RATIO — SIGNIFICANT CHANGE UP (ref 0–0.2)
PROTHROM AB SERPL-ACNC: 11.8 SEC — SIGNIFICANT CHANGE UP (ref 9.8–12.7)
RBC # BLD: 3.69 M/UL — LOW (ref 4.2–5.8)
RBC # FLD: 17.7 % — HIGH (ref 11–15)
RBC CASTS # UR COMP ASSIST: ABNORMAL /HPF (ref 0–4)
SAO2 % BLDA: 89 % — LOW (ref 92–96)
SODIUM SERPL-SCNC: 138 MMOL/L — SIGNIFICANT CHANGE UP (ref 135–145)
SODIUM SERPL-SCNC: 140 MMOL/L — SIGNIFICANT CHANGE UP (ref 135–145)
SODIUM SERPL-SCNC: 140 MMOL/L — SIGNIFICANT CHANGE UP (ref 135–145)
SODIUM UR-SCNC: 30 MMOL/L — SIGNIFICANT CHANGE UP
SP GR SPEC: 1.01 — SIGNIFICANT CHANGE UP (ref 1.01–1.02)
THC UR QL: NEGATIVE — SIGNIFICANT CHANGE UP
TROPONIN I SERPL-MCNC: 0.35 NG/ML — HIGH (ref 0.01–0.04)
UROBILINOGEN FLD QL: 1 MG/DL
WBC # BLD: 5.8 K/UL — SIGNIFICANT CHANGE UP (ref 3.8–10.5)
WBC # FLD AUTO: 5.8 K/UL — SIGNIFICANT CHANGE UP (ref 3.8–10.5)
WBC UR QL: SIGNIFICANT CHANGE UP

## 2017-09-06 PROCEDURE — 99223 1ST HOSP IP/OBS HIGH 75: CPT

## 2017-09-06 PROCEDURE — 70450 CT HEAD/BRAIN W/O DYE: CPT | Mod: 26

## 2017-09-06 PROCEDURE — 93010 ELECTROCARDIOGRAM REPORT: CPT

## 2017-09-06 PROCEDURE — 74176 CT ABD & PELVIS W/O CONTRAST: CPT | Mod: 26

## 2017-09-06 PROCEDURE — 99285 EMERGENCY DEPT VISIT HI MDM: CPT

## 2017-09-06 PROCEDURE — 93970 EXTREMITY STUDY: CPT | Mod: 26

## 2017-09-06 PROCEDURE — 71010: CPT | Mod: 26

## 2017-09-06 RX ORDER — SODIUM CHLORIDE 9 MG/ML
3000 INJECTION INTRAMUSCULAR; INTRAVENOUS; SUBCUTANEOUS ONCE
Qty: 0 | Refills: 0 | Status: COMPLETED | OUTPATIENT
Start: 2017-09-06 | End: 2017-09-06

## 2017-09-06 RX ORDER — POTASSIUM CHLORIDE 20 MEQ
40 PACKET (EA) ORAL ONCE
Qty: 0 | Refills: 0 | Status: COMPLETED | OUTPATIENT
Start: 2017-09-06 | End: 2017-09-06

## 2017-09-06 RX ORDER — ASPIRIN/CALCIUM CARB/MAGNESIUM 324 MG
325 TABLET ORAL ONCE
Qty: 0 | Refills: 0 | Status: COMPLETED | OUTPATIENT
Start: 2017-09-06 | End: 2017-09-06

## 2017-09-06 RX ORDER — BUDESONIDE AND FORMOTEROL FUMARATE DIHYDRATE 160; 4.5 UG/1; UG/1
2 AEROSOL RESPIRATORY (INHALATION)
Qty: 0 | Refills: 0 | Status: DISCONTINUED | OUTPATIENT
Start: 2017-09-06 | End: 2017-09-12

## 2017-09-06 RX ORDER — POTASSIUM CHLORIDE 20 MEQ
10 PACKET (EA) ORAL
Qty: 0 | Refills: 0 | Status: COMPLETED | OUTPATIENT
Start: 2017-09-06 | End: 2017-09-06

## 2017-09-06 RX ORDER — SODIUM CHLORIDE 9 MG/ML
1000 INJECTION INTRAMUSCULAR; INTRAVENOUS; SUBCUTANEOUS
Qty: 0 | Refills: 0 | Status: DISCONTINUED | OUTPATIENT
Start: 2017-09-06 | End: 2017-09-08

## 2017-09-06 RX ORDER — ACETAMINOPHEN 500 MG
650 TABLET ORAL ONCE
Qty: 0 | Refills: 0 | Status: COMPLETED | OUTPATIENT
Start: 2017-09-06 | End: 2017-09-06

## 2017-09-06 RX ORDER — PANTOPRAZOLE SODIUM 20 MG/1
40 TABLET, DELAYED RELEASE ORAL
Qty: 0 | Refills: 0 | Status: DISCONTINUED | OUTPATIENT
Start: 2017-09-06 | End: 2017-09-12

## 2017-09-06 RX ORDER — SODIUM CHLORIDE 9 MG/ML
3 INJECTION INTRAMUSCULAR; INTRAVENOUS; SUBCUTANEOUS ONCE
Qty: 0 | Refills: 0 | Status: COMPLETED | OUTPATIENT
Start: 2017-09-06 | End: 2017-09-06

## 2017-09-06 RX ORDER — CALCIUM GLUCONATE 100 MG/ML
2 VIAL (ML) INTRAVENOUS ONCE
Qty: 0 | Refills: 0 | Status: COMPLETED | OUTPATIENT
Start: 2017-09-06 | End: 2017-09-06

## 2017-09-06 RX ORDER — MAGNESIUM SULFATE 500 MG/ML
2 VIAL (ML) INJECTION ONCE
Qty: 0 | Refills: 0 | Status: COMPLETED | OUTPATIENT
Start: 2017-09-06 | End: 2017-09-06

## 2017-09-06 RX ORDER — PIPERACILLIN AND TAZOBACTAM 4; .5 G/20ML; G/20ML
3.38 INJECTION, POWDER, LYOPHILIZED, FOR SOLUTION INTRAVENOUS ONCE
Qty: 0 | Refills: 0 | Status: COMPLETED | OUTPATIENT
Start: 2017-09-06 | End: 2017-09-06

## 2017-09-06 RX ORDER — HEPARIN SODIUM 5000 [USP'U]/ML
5000 INJECTION INTRAVENOUS; SUBCUTANEOUS EVERY 12 HOURS
Qty: 0 | Refills: 0 | Status: DISCONTINUED | OUTPATIENT
Start: 2017-09-06 | End: 2017-09-12

## 2017-09-06 RX ORDER — IPRATROPIUM/ALBUTEROL SULFATE 18-103MCG
3 AEROSOL WITH ADAPTER (GRAM) INHALATION EVERY 6 HOURS
Qty: 0 | Refills: 0 | Status: DISCONTINUED | OUTPATIENT
Start: 2017-09-06 | End: 2017-09-08

## 2017-09-06 RX ORDER — FERROUS SULFATE 325(65) MG
325 TABLET ORAL DAILY
Qty: 0 | Refills: 0 | Status: DISCONTINUED | OUTPATIENT
Start: 2017-09-06 | End: 2017-09-12

## 2017-09-06 RX ORDER — PANTOPRAZOLE SODIUM 20 MG/1
40 TABLET, DELAYED RELEASE ORAL ONCE
Qty: 0 | Refills: 0 | Status: COMPLETED | OUTPATIENT
Start: 2017-09-06 | End: 2017-09-06

## 2017-09-06 RX ORDER — POTASSIUM CHLORIDE 20 MEQ
40 PACKET (EA) ORAL EVERY 4 HOURS
Qty: 0 | Refills: 0 | Status: COMPLETED | OUTPATIENT
Start: 2017-09-06 | End: 2017-09-06

## 2017-09-06 RX ADMIN — SODIUM CHLORIDE 3000 MILLILITER(S): 9 INJECTION INTRAMUSCULAR; INTRAVENOUS; SUBCUTANEOUS at 09:50

## 2017-09-06 RX ADMIN — Medication 650 MILLIGRAM(S): at 12:01

## 2017-09-06 RX ADMIN — PANTOPRAZOLE SODIUM 40 MILLIGRAM(S): 20 TABLET, DELAYED RELEASE ORAL at 10:08

## 2017-09-06 RX ADMIN — Medication 325 MILLIGRAM(S): at 10:49

## 2017-09-06 RX ADMIN — HEPARIN SODIUM 5000 UNIT(S): 5000 INJECTION INTRAVENOUS; SUBCUTANEOUS at 20:36

## 2017-09-06 RX ADMIN — Medication 40 MILLIEQUIVALENT(S): at 10:49

## 2017-09-06 RX ADMIN — Medication 100 MILLIEQUIVALENT(S): at 17:33

## 2017-09-06 RX ADMIN — Medication 100 MILLIEQUIVALENT(S): at 20:36

## 2017-09-06 RX ADMIN — Medication 50 GRAM(S): at 16:25

## 2017-09-06 RX ADMIN — Medication 100 MILLIEQUIVALENT(S): at 12:01

## 2017-09-06 RX ADMIN — Medication 100 MILLIEQUIVALENT(S): at 11:08

## 2017-09-06 RX ADMIN — Medication 100 MILLIEQUIVALENT(S): at 18:56

## 2017-09-06 RX ADMIN — Medication 100 GRAM(S): at 15:34

## 2017-09-06 RX ADMIN — Medication 40 MILLIEQUIVALENT(S): at 21:39

## 2017-09-06 RX ADMIN — PIPERACILLIN AND TAZOBACTAM 200 GRAM(S): 4; .5 INJECTION, POWDER, LYOPHILIZED, FOR SOLUTION INTRAVENOUS at 11:03

## 2017-09-06 RX ADMIN — Medication 650 MILLIGRAM(S): at 12:50

## 2017-09-06 RX ADMIN — SODIUM CHLORIDE 3 MILLILITER(S): 9 INJECTION INTRAMUSCULAR; INTRAVENOUS; SUBCUTANEOUS at 09:41

## 2017-09-06 RX ADMIN — Medication 40 MILLIEQUIVALENT(S): at 16:30

## 2017-09-06 RX ADMIN — Medication 100 MILLIEQUIVALENT(S): at 13:48

## 2017-09-06 RX ADMIN — Medication 3 MILLILITER(S): at 17:31

## 2017-09-06 RX ADMIN — SODIUM CHLORIDE 175 MILLILITER(S): 9 INJECTION INTRAMUSCULAR; INTRAVENOUS; SUBCUTANEOUS at 20:37

## 2017-09-06 RX ADMIN — SODIUM CHLORIDE 175 MILLILITER(S): 9 INJECTION INTRAMUSCULAR; INTRAVENOUS; SUBCUTANEOUS at 15:35

## 2017-09-06 RX ADMIN — Medication 650 MILLIGRAM(S): at 02:37

## 2017-09-06 NOTE — H&P ADULT - HISTORY OF PRESENT ILLNESS
38yo obese male with pmh COPD, chronic etoh, lupus, h/o afib previously on coumadin (taken off on Jan 2017 - ? LV thrombus), chronic abd pain, chronic diarrhea/constipation, presents with dizziness noted today, unable to stand up. Pt also with LE edema x 1 week seen by PMD placed on lasix. Denies CP, sob, worsenind abd pain, worsening diarrhea/constipation. Pt reports occasonial BRB 2/2 to hemorrhoids, but denies any in past month. Last drink today, drinks daily. Pt reports seizure like episode 2 days ago, did not go to hospital and denies any change in etoh intake.  Wife reports he was in sleep, sat up, screamed, held left arm, then eyes rolled back, shook all over, + tongue bleeding for a few minutes. No urinary/bowel incontinence. No h/o seizure per family.  Pt feels legs heavy but that edema has improved.

## 2017-09-06 NOTE — ED ADULT NURSE NOTE - OBJECTIVE STATEMENT
Received pt lying on stretcher c/o dizziness and weakness for about 3 days , got worse today accompanied with blurry vision, numbness to the extremeties on occasion. Denies any CP, chronic SOB with COPD, denies any fever , +productive cough + smoker since the age of 10 , 2-pack of pack, per girlfriend at bedside  pt had a seizure at home about 5 day ago, +drinker 2 pints of vodka a day.

## 2017-09-06 NOTE — CONSULT NOTE ADULT - ASSESSMENT
37 year old male with hx of SLE, EtOH abuse presented with B/L LE edema and dizziness, and found to have rhabdomyolysis and SANDRA.  NO other obvious signs/symptoms of active SLE at this time.  DDx for rhabdomyolysis includes alcohol vs recent seizure vs. hypokalemia.  Plaquenil-induced myopathy also possible though unlikely, as this is a rare complication and pt has been taking a low dose.  SANDRA most likely due to rhabdo.  Doubt lupus nephritis, but need further w/u to rule it out.    - Check dsDNA, C3, C4,U Pr/Cr, Agnes-1 AB.    - Agree w/ IVF, but need to monitor fluid status closely.    - Cont to trend CPK. 37 year old male with hx of SLE, EtOH abuse presented with B/L LE edema and dizziness, and found to have rhabdomyolysis and SANDRA.  NO other obvious signs/symptoms of active SLE at this time.  DDx for rhabdomyolysis includes alcohol vs recent seizure vs. hypokalemia vs statin-induced (pt on Zocor).  Plaquenil-induced myopathy also possible though unlikely, as this is a rare complication and pt has been taking a low dose.  SANDRA most likely due to rhabdo.  Doubt lupus nephritis, but need further w/u to rule it out.    - Check dsDNA, C3, C4,U Pr/Cr, Agnes-1 AB.    - Agree w/ IVF, but need to monitor fluid status closely.    - Cont to trend CPK.    - Hold Zocor and Plaquenil for now.

## 2017-09-06 NOTE — ED PROVIDER NOTE - CARE PLAN
Principal Discharge DX:	Rhabdomyolysis  Secondary Diagnosis:	Renal failure  Secondary Diagnosis:	Hypokalemia

## 2017-09-06 NOTE — H&P ADULT - NSHPLABSRESULTS_GEN_ALL_CORE
12.2   5.8   )-----------( 146      ( 06 Sep 2017 09:55 )             36.5   09-06    138  |  87<L>  |  46<H>  ----------------------------<  117<H>  2.1<LL>   |  36<H>  |  3.09<H>    Ca    6.2<LL>      06 Sep 2017 09:55    TPro  6.5  /  Alb  2.0<L>  /  TBili  0.8  /  DBili  x   /  AST  530<H>  /  ALT  262<H>  /  AlkPhos  215<H>  09-06    < from: CT Head No Cont (09.06.17 @ 14:43) >    NTERPRETATION:  INDICATION:  Seizure  TECHNIQUE:  A non contrast 2.5mm axial CT study of the brain was   performed from skull base to vertex. Coronal and sagittal reformations   were generated from the axial data.  COMPARISON EXAMINATION:  CT 1/30/2017    FINDINGS:      HEMISPHERES:  Mild volume loss and involutional changes are again noted,   stable compared with prior study. No acute infarct, hemorrhage, or space  occupying lesion is identified.  VENTRICLES:  Midline and normal in size.  POSTERIOR FOSSA:  The brain stem and cerebellum are unremarkable.  No CP   angle lesion noted.  EXTRACEREBRAL SPACES:  No subdural or epidural collections are noted.  SKULL BASE AND CALVARIUM:  Appears intact.  No fracture or destructive   lesion is identified.  SINUSES AND MASTOIDS:  Mild thickening noted in the sinuses without fluid   levels.  MISCELLANEOUS:  No orbital or suprasellar abnormality noted.      IMPRESSION:    1)  mild cerebral volume loss and involutional change, commensurate with   age. No acute cerebral pathology suggested.  2)  follow-up MR imaging may be considered for further assessment..    < end of copied text >    < from: CT Abdomen and Pelvis No Cont (09.06.17 @ 13:30) >    MPRESSION:     Diffuse fatty infiltration of the liver.    Questionable mild pericolonic fat stranding involving the mid sigmoid   colon. Correlate clinically for diverticulitis.     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      < end of copied text >

## 2017-09-06 NOTE — H&P ADULT - NSHPPHYSICALEXAM_GEN_ALL_CORE
GENERAL: NAD well-developed  HEAD:  Atraumatic, Normocephalic  EYES: EOMI, PERRLA, conjunctiva and sclera clear  ENMT: No tonsillar erythema, exudates, or enlargement; Moist mucous membranes, Good dentition, No lesions  NECK: Supple, No JVD, Normal thyroid  NERVOUS SYSTEM:  Alert & Oriented X3, Good concentration; Motor Strength 5/5 B/L upper and lower extremities; DTRs 2+ intact and no tremors   CHEST/LUNG: Clear to percussion bilaterally; No rales, rhonchi, wheezing, or rubs  HEART: Regular rate and rhythm; No murmurs, rubs, or gallops  ABDOMEN: Soft, Nontender, Nondistended; Bowel sounds present  EXTREMITIES:  2+ Peripheral Pulses, No clubbing, cyanosis, or edema  LYMPH: No lymphadenopathy   SKIN: No rashes or lesions

## 2017-09-06 NOTE — CONSULT NOTE ADULT - SUBJECTIVE AND OBJECTIVE BOX
Patient is a 37y old  Male who presents with a chief complaint of seizure/dizziness (06 Sep 2017 15:04)  HPI:  38yo obese male with pmh COPD, chronic etoh, lupus, h/o afib previously on coumadin (taken off on 2017 - ? LV thrombus), chronic abd pain, chronic diarrhea/constipation, presents with dizziness noted today, unable to stand up. Pt also with LE edema x 1 week seen by PMD placed on lasix. Denies CP, sob, worsenind abd pain, worsening diarrhea/constipation. Pt reports occasonial BRB 2/2 to hemorrhoids, but denies any in past month. Last drink today, drinks daily. Pt reports seizure like episode 2 days ago, did not go to hospital and denies any change in etoh intake.  Wife reports he was in sleep, sat up, screamed, held left arm, then eyes rolled back, shook all over, + tongue bleeding for a few minutes. No urinary/bowel incontinence. No h/o seizure per family.  Pt feels legs heavy but that edema has improved    PAST MEDICAL & SURGICAL HISTORY:  Tobacco abuse- / PPD for 28 yrs   Chronic obstructive pulmonary disease, unspecified COPD type  Platelets decreased  Kidney problem: born with 1 kidney, right side  Heart valve problem: leaky  Diverticulitis  Enlarged heart  A-fib  History of throat surgery: stretched  History of throat surgery: cyst removed  Cataract (lens) fragments in eye following cataract surgery, left eye    FAMILY HISTORY:  Family history of sleep apnea (Mother)  Family history of diabetes mellitus (Mother)  Allergies    sulfa drugs (Unknown)    Intolerances    MEDICATIONS  (STANDING):  sodium chloride 0.9%. 1000 milliLiter(s) (175 mL/Hr) IV Continuous <Continuous>  pantoprazole    Tablet 40 milliGRAM(s) Oral before breakfast  ferrous    sulfate 325 milliGRAM(s) Oral daily  ALBUTerol/ipratropium for Nebulization 3 milliLiter(s) Nebulizer every 6 hours  heparin  Injectable 5000 Unit(s) SubCutaneous every 12 hours  potassium chloride   Powder 40 milliEquivalent(s) Oral every 4 hours  potassium chloride  10 mEq/100 mL IVPB 10 milliEquivalent(s) IV Intermittent every 1 hour    MEDICATIONS  (PRN):    ROS  constitutional - denies any fever, weight loss or weight gain , normal appetite   pulmonary- c/o cough , chronic , wheezing  cardiovascular- denies any chest pain  GI- denies any nausea , vomiting or diarrhea  Neuro- c/o dizziness and  weakness    Vital Signs Last 24 Hrs  T(C): 37.1 (06 Sep 2017 15:49), Max: 37.1 (06 Sep 2017 15:49)  T(F): 98.7 (06 Sep 2017 15:49), Max: 98.7 (06 Sep 2017 15:49)  HR: 98 (06 Sep 2017 16:33) (84 - 98)  BP: 124/70 (06 Sep 2017 16:33) (65/41 - 124/70)  BP(mean): --  RR: 20 (06 Sep 2017 15:49) (18 - 30)  SpO2: 93% (06 Sep 2017 15:49) (93% - 94%)    Physical Exam  obese male  lying in bed in no respiratory distress  HEENT - EOMI, PERRLA ,neck supple , No JVD, crowded pharynx  lungs - decreased BS, no wheezing , ronchi or rales   COR- [id number] , no murmer  Abd- obese, BS+, diffuse tenderness tenderness, no guarding   ext- ecc neg, good pulses  Neuro - Alert , oriented X3   Skin- No rashes     Urinalysis Basic - ( 06 Sep 2017 11:12 )    Color: Yellow / Appearance: Clear / S.015 / pH: x  Gluc: x / Ketone: Negative  / Bili: Negative / Urobili: 1 mg/dL   Blood: x / Protein: 15 mg/dL / Nitrite: Negative   Leuk Esterase: Trace / RBC: 6-10 /HPF / WBC 3-5   Sq Epi: x / Non Sq Epi: Few / Bacteria: x    PT/INR - ( 06 Sep 2017 09:55 )   PT: 11.8 sec;   INR: 1.08 ratio         PTT - ( 06 Sep 2017 09:55 )  PTT:28.2 secCARDIAC MARKERS ( 06 Sep 2017 09:55 )  .346 ng/mL / x     / >39876 U/L / x     / 6.7 ng/mL                          12.2   5.8   )-----------( 146      ( 06 Sep 2017 09:55 )             36.5   09-06    140  |  95<L>  |  42<H>  ----------------------------<  76  2.4<LL>   |  31  |  2.60<H>    Ca    5.7<LL>      06 Sep 2017 15:27  Phos  3.9       Mg     0.8         TPro  6.5  /  Alb  2.0<L>  /  TBili  0.8  /  DBili  x   /  AST  530<H>  /  ALT  262<H>  /  AlkPhos  215<H>      CXR - no acute infiltrates
HISTORY OF PRESENT ILLNESS:    Patient is a 37y Male    HPI:  36yo male with pMx as lsited below, including SLE, reports that he was in his USOH until about 1 week ago, when he began to experience progressively worsening B/L LE edema.  He saw his PMD who increased his dose of Lasix, but no improvement.  He reports that he is now having difficulty ambulating, as his legs feel very heavy.  Of note, pt reports that he experienced a seizure 2 days ago, witnessed by his wife.  This morning, pt woke up with severe dizziness, described as lightheadedness, and felt like he was going to pass out.    Of note, pt w/ hx of SLE, diagnosed about 15 years ago.  Manifestations include arthritis, malar rash, and oral ulcers.  Symptoms have been well controlled on Plaquenil.  Pt also w/ hx of EtOH abuse, last drink this morning.    PAST MEDICAL & SURGICAL HISTORY:  Tobacco abuse  Chronic obstructive pulmonary disease, unspecified COPD type  Platelets decreased  Kidney problem: born with 1 kidney, right side  Heart valve problem: leaky  Diverticulitis  Enlarged heart  A-fib  History of throat surgery: stretched  History of throat surgery: cyst removed  Cataract (lens) fragments in eye following cataract surgery, left eye      ROS:  (+) oral ulcers;  No fever/chills, wt loss, night sweats, chest pain/dyspnea/cough, recent rashes, recent joint pain, alopecia, photosensitivity, dry eye/dry mouth, Raynaud's, dysphagia, focal weakness, or eye symptoms.    MEDICATIONS  (STANDING):  sodium chloride 0.9%. 1000 milliLiter(s) (175 mL/Hr) IV Continuous <Continuous>  pantoprazole    Tablet 40 milliGRAM(s) Oral before breakfast  ferrous    sulfate 325 milliGRAM(s) Oral daily  ALBUTerol/ipratropium for Nebulization 3 milliLiter(s) Nebulizer every 6 hours  heparin  Injectable 5000 Unit(s) SubCutaneous every 12 hours  potassium chloride   Powder 40 milliEquivalent(s) Oral every 4 hours  potassium chloride  10 mEq/100 mL IVPB 10 milliEquivalent(s) IV Intermittent every 1 hour  buDESOnide 160 MICROgram(s)/formoterol 4.5 MICROgram(s) Inhaler 2 Puff(s) Inhalation two times a day    MEDICATIONS  (PRN):      Allergies    sulfa drugs (Unknown)        FAMILY HISTORY:  Family history of sleep apnea (Mother)  Family history of diabetes mellitus (Mother)      SOCIAL HISTORY:  Tobacco--   none            Vital Signs Last 24 Hrs  T(C): 37.1 (06 Sep 2017 15:49), Max: 37.1 (06 Sep 2017 15:49)  T(F): 98.7 (06 Sep 2017 15:49), Max: 98.7 (06 Sep 2017 15:49)  HR: 98 (06 Sep 2017 17:46) (84 - 99)  BP: 124/70 (06 Sep 2017 16:33) (65/41 - 124/70)  BP(mean): --  RR: 20 (06 Sep 2017 15:49) (18 - 30)  SpO2: 86% (06 Sep 2017 17:46) (86% - 94%)    PHYSICAL EXAM:  General :  NAD  HEENT--  no oral ulcers  Nodes--   LAD  Lungs--  CTA B/L  Heart--  RRR, nlS1 &S2 normal;   Abdomen--  soft, NT, ND +BS  Skin:  no rashes  Musculoskeletal exam:  no synovitis; full ROM in all joints;    Strength:  unable to lift legs off bed;  normal strength in B/L UE's    LABS:                        12.2   5.8   )-----------( 146      ( 06 Sep 2017 09:55 )             36.5     09-06    140  |  97  |  42<H>  ----------------------------<  77  2.8<LL>   |  30  |  2.42<H>    Ca    6.2<LL>      06 Sep 2017 18:06  Phos  3.9       Mg     0.8         TPro  6.5  /  Alb  2.0<L>  /  TBili  0.8  /  DBili  x   /  AST  530<H>  /  ALT  262<H>  /  AlkPhos  215<H>  09-06    PT/INR - ( 06 Sep 2017 09:55 )   PT: 11.8 sec;   INR: 1.08 ratio         PTT - ( 06 Sep 2017 09:55 )  PTT:28.2 sec  Urinalysis Basic - ( 06 Sep 2017 11:12 )    Color: Yellow / Appearance: Clear / S.015 / pH: x  Gluc: x / Ketone: Negative  / Bili: Negative / Urobili: 1 mg/dL   Blood: x / Protein: 15 mg/dL / Nitrite: Negative   Leuk Esterase: Trace / RBC: 6-10 /HPF / WBC 3-5   Sq Epi: x / Non Sq Epi: Few / Bacteria: x    Creatine Kinase, Serum (17 @ 09:55)    Creatine Kinase, Serum: >18652 U/L            RADIOLOGY & ADDITIONAL STUDIES:  < from: US Duplex Venous Lower Ext Complete, Bilateral (17 @ 12:41) >  EXAM:  US DPLX LWR EXT VEINS COMPL BI                            PROCEDURE DATE:  2017          INTERPRETATION:  CLINICAL INFORMATION: Bilateral lower extremity edema.    TECHNIQUE:    Duplex sonography of the bilateral lower extremities withcolor and   spectral Doppler, with and without compression.    FINDINGS:    The bilateral external iliac, greater saphenous, common femoral, deep   femoral, femoral, popliteal, posterior tibial, and peroneal veins are   compressed normally and demonstrate color flow with respiratory variation   by Doppler.     IMPRESSION:    No evidence of deep venous thrombosis in the bilateral lower extremities.    < end of copied text >
Patient is a 37y old  Male who presents with a chief complaint of increasing edema.  HPI: Recently placed on Lasix daily for LE edema last week. Hx of ETOH abuse last drink this am; 2 pints vodka daily;   Hx solitary kidney ( congenital); SLE hx no active issues recently; no hx of renal disease; proteinuria;     Noted CPK >14,000 Cr 3.1    Smoker with hx of COPD;         PAST MEDICAL & SURGICAL HISTORY:  Tobacco abuse  Chronic obstructive pulmonary disease, unspecified COPD type  Platelets decreased  Kidney problem: born with 1 kidney, right side  Heart valve problem: leaky  Diverticulitis  Enlarged heart  A-fib  History of throat surgery: stretched  History of throat surgery: cyst removed  Cataract (lens) fragments in eye following cataract surgery, left eye    FAMILY HISTORY:  Family history of sleep apnea (Mother)  Family history of diabetes mellitus (Mother)    sulfa drugs (Unknown)    MEDICATIONS  (STANDING):  sodium chloride 0.9%. 1000 milliLiter(s) (175 mL/Hr) IV Continuous <Continuous>    MEDICATIONS  (PRN):    Vital Signs Last 24 Hrs  T(C): 36.6 (06 Sep 2017 11:), Max: 36.6 (06 Sep 2017 11:)  T(F): 97.8 (06 Sep 2017 11:), Max: 97.8 (06 Sep 2017 11:)  HR: 86 (06 Sep 2017 11:) (84 - 93)  BP: 110/63 (06 Sep 2017 11:) (65/41 - 110/63)  BP(mean): --  RR: 30 (06 Sep 2017 11:) (18 - 30)  SpO2: 94% (06 Sep 2017 11:) (93% - 94%)    CAPILLARY BLOOD GLUCOSE        PHYSICAL EXAM:      T(C): 36.6 (06 Sep 2017 11:), Max: 36.6 (06 Sep 2017 11:01)  HR: 86 (06 Sep 2017 11:) (84 - 93)  BP: 110/63 (06 Sep 2017 11:) (65/41 - 110/63)  RR: 30 (06 Sep 2017 11:) (18 - 30)  SpO2: 94% (06 Sep 2017 11:) (93% - 94%)  Wt(kg): --  Respiratory: scattered wheezes b/l  Cardiovascular: S1 S2  Gastrointestinal: soft NT ND obese +BS  Extremities:  2 edema                  138  |  87<L>  |  46<H>  ----------------------------<  117<H>  2.1<LL>   |  36<H>  |  3.09<H>    Ca    6.2<LL>   Corrected Ca 7.8  06 Sep 2017 09:55    TPro  6.5  /  Alb  2.0<L>  /  TBili  0.8  /  DBili  x   /  AST  530<H>  /  ALT  262<H>  /  AlkPhos  215<H>                            12.2   5.8   )-----------( 146      ( 06 Sep 2017 09:55 )             36.5     Urinalysis Basic - ( 06 Sep 2017 11:12 )    Color: Yellow / Appearance: Clear / S.015 / pH: x  Gluc: x / Ketone: Negative  / Bili: Negative / Urobili: 1 mg/dL   Blood: x / Protein: 15 mg/dL / Nitrite: Negative   Leuk Esterase: Trace / RBC: 6-10 /HPF / WBC 3-5   Sq Epi: x / Non Sq Epi: Few / Bacteria: x        Assessment and Plan    SANDRA suspected Etoh, hypokalemic induced rhabdomyolysis ATN, severe electrolyte imbalance; Etoh hepatitis; hx of SLE, to exclude acute/ chronic renal involvement; risk for HRS, alcoholic hepatitis.  IVF NS for now; urine output trend; may need to decrease volume resuscitation if retention and edema occur. At this point, there is no benefit of adding bicarbonate; may drive K and ionized Ca lower;   Follow Mg and phos; urine output trend.  Serial CPK; ELINOR ds DNA; C3 C4; RF; ANCAs.  Will follow closely.

## 2017-09-06 NOTE — ED PROVIDER NOTE - PHYSICAL EXAMINATION
Gen: Alert, Well appearing  Head: NC, AT, PERRL, EOMI, normal lids/conjunctiva   ENT: Bilateral TM WNL, normal hearing, patent oropharynx without erythema/exudate, uvula midline  Neck: supple, no tenderness/meningismus/JVD   Pulm: + diffuse exp wheeze (pt reports baseline)  CV: RRR, no M/R/G, poor peripheral pulses  Abd: soft, NT/ND, +BS, no guarding/rebound tenderness  Mskel: ++ LE edema,   Skin: no rash   Neuro: AAOx3, no sensory/motor deficits, CN 2-12 intact

## 2017-09-06 NOTE — ED PROVIDER NOTE - OBJECTIVE STATEMENT
36yo obese male with pmh COPD, chronic etoh, lupus, h/o afib previously on coumadin (taken off on Jan 2017 - ? LV thrombus), chronic abd pain, chronic diarrhea/constipation, presents with dizziness noted today, unable to stand up. Pt also with LE edema x 1 week seen by PMD placed on lasix. Denies CP, sob, worsenind abd pain, worsening diarrhea/constipation. Pt reports occasonial BRB 2/2 to hemorrhoids, but denies any in past month. Last drink today, drinks daily. Pt reports seizure like episode 2 days ago, did not go to hospital and denies any change in etoh intake.  Wife reports he was in sleep, sat up, screamed, held left arm, then eyes rolled back, shook all over, + tongue bleeding for a few minutes. No urinary/bowel incontinence. No h/o seizure per family.  Pt feels legs heavy but that edema has improved.     ROS: No fever/chills. No photophobia/eye pain/changes in vision, No ear pain/sore throat/dysphagia, No chest pain/palpitations. No SOB/cough/stridor. No abdominal pain, N/V/D, no black/bloody bm. No dysuria/frequency/discharge, No headache. + Dizziness.  No rash.  No numbness/tingling/weakness.

## 2017-09-06 NOTE — CONSULT NOTE ADULT - ASSESSMENT
38 yo obese male with h/o 42 pack years of smoking history and ETOH abuse came with c/o feeling weak, patient also had ? seizure . patient also has h/o lupus , COPD   Rhabdomyolysis   SANDRA   COPD  hypokalemia  increase LFT's secondary to thabdo new onset seizure ? secondary to ETOH   ? SHANIQUA  Morbid obesity    Recommendations    IV fluids   f/u urine output, CPK , BUN/Cr, LFT's  agree with Duoneb   start advair  oxygen via Nasal Cannula   baseline ABG   monitor oxygen saturation   need sleep study as outpatient   weight reduction   CIWA protocol   Thank you for this consult  will f/u the patient with you

## 2017-09-07 LAB
ALBUMIN SERPL ELPH-MCNC: 1.9 G/DL — LOW (ref 3.3–5)
ALP SERPL-CCNC: 180 U/L — HIGH (ref 40–120)
ALT FLD-CCNC: 220 U/L — HIGH (ref 12–78)
ANION GAP SERPL CALC-SCNC: 12 MMOL/L — SIGNIFICANT CHANGE UP (ref 5–17)
AST SERPL-CCNC: 381 U/L — HIGH (ref 15–37)
BILIRUB SERPL-MCNC: 0.9 MG/DL — SIGNIFICANT CHANGE UP (ref 0.2–1.2)
BUN SERPL-MCNC: 37 MG/DL — HIGH (ref 7–23)
C3 SERPL-MCNC: 73 MG/DL — LOW (ref 80–180)
C4 SERPL-MCNC: 25 MG/DL — SIGNIFICANT CHANGE UP (ref 10–45)
CALCIUM SERPL-MCNC: 6.1 MG/DL — CRITICAL LOW (ref 8.5–10.1)
CHLORIDE SERPL-SCNC: 99 MMOL/L — SIGNIFICANT CHANGE UP (ref 96–108)
CK SERPL-CCNC: 9429 U/L — HIGH (ref 26–308)
CO2 SERPL-SCNC: 31 MMOL/L — SIGNIFICANT CHANGE UP (ref 22–31)
CREAT ?TM UR-MCNC: 71 MG/DL — SIGNIFICANT CHANGE UP
CREAT SERPL-MCNC: 1.59 MG/DL — HIGH (ref 0.5–1.3)
CULTURE RESULTS: NO GROWTH — SIGNIFICANT CHANGE UP
GLUCOSE SERPL-MCNC: 70 MG/DL — SIGNIFICANT CHANGE UP (ref 70–99)
HAV IGM SER-ACNC: SIGNIFICANT CHANGE UP
HBV CORE IGM SER-ACNC: SIGNIFICANT CHANGE UP
HBV SURFACE AG SER-ACNC: SIGNIFICANT CHANGE UP
HCV AB S/CO SERPL IA: 0.1 S/CO — SIGNIFICANT CHANGE UP
HCV AB SERPL-IMP: SIGNIFICANT CHANGE UP
MAGNESIUM SERPL-MCNC: 0.9 MG/DL — CRITICAL LOW (ref 1.6–2.6)
MAGNESIUM SERPL-MCNC: 1 MG/DL — CRITICAL LOW (ref 1.6–2.6)
PHOSPHATE SERPL-MCNC: 2.3 MG/DL — LOW (ref 2.5–4.5)
POTASSIUM SERPL-MCNC: 2.3 MMOL/L — CRITICAL LOW (ref 3.5–5.3)
POTASSIUM SERPL-SCNC: 2.3 MMOL/L — CRITICAL LOW (ref 3.5–5.3)
PROT ?TM UR-MCNC: 18 MG/DL — HIGH (ref 0–12)
PROT SERPL-MCNC: 5.9 GM/DL — LOW (ref 6–8.3)
PROT/CREAT UR-RTO: 0.3 RATIO — HIGH (ref 0–0.2)
SODIUM SERPL-SCNC: 142 MMOL/L — SIGNIFICANT CHANGE UP (ref 135–145)
SPECIMEN SOURCE: SIGNIFICANT CHANGE UP

## 2017-09-07 PROCEDURE — 99233 SBSQ HOSP IP/OBS HIGH 50: CPT

## 2017-09-07 RX ORDER — POTASSIUM CHLORIDE 20 MEQ
10 PACKET (EA) ORAL
Qty: 0 | Refills: 0 | Status: COMPLETED | OUTPATIENT
Start: 2017-09-07 | End: 2017-09-07

## 2017-09-07 RX ORDER — POTASSIUM PHOSPHATE, MONOBASIC POTASSIUM PHOSPHATE, DIBASIC 236; 224 MG/ML; MG/ML
15 INJECTION, SOLUTION INTRAVENOUS ONCE
Qty: 0 | Refills: 0 | Status: COMPLETED | OUTPATIENT
Start: 2017-09-07 | End: 2017-09-07

## 2017-09-07 RX ORDER — POTASSIUM CHLORIDE 20 MEQ
40 PACKET (EA) ORAL EVERY 4 HOURS
Qty: 0 | Refills: 0 | Status: COMPLETED | OUTPATIENT
Start: 2017-09-07 | End: 2017-09-07

## 2017-09-07 RX ORDER — ACETAMINOPHEN 500 MG
650 TABLET ORAL ONCE
Qty: 0 | Refills: 0 | Status: COMPLETED | OUTPATIENT
Start: 2017-09-07 | End: 2017-09-07

## 2017-09-07 RX ORDER — MAGNESIUM OXIDE 400 MG ORAL TABLET 241.3 MG
400 TABLET ORAL
Qty: 0 | Refills: 0 | Status: COMPLETED | OUTPATIENT
Start: 2017-09-07 | End: 2017-09-09

## 2017-09-07 RX ORDER — NICOTINE POLACRILEX 2 MG
1 GUM BUCCAL DAILY
Qty: 0 | Refills: 0 | Status: DISCONTINUED | OUTPATIENT
Start: 2017-09-07 | End: 2017-09-12

## 2017-09-07 RX ORDER — MAGNESIUM SULFATE 500 MG/ML
2 VIAL (ML) INJECTION EVERY 4 HOURS
Qty: 0 | Refills: 0 | Status: COMPLETED | OUTPATIENT
Start: 2017-09-07 | End: 2017-09-07

## 2017-09-07 RX ADMIN — POTASSIUM PHOSPHATE, MONOBASIC POTASSIUM PHOSPHATE, DIBASIC 63.75 MILLIMOLE(S): 236; 224 INJECTION, SOLUTION INTRAVENOUS at 11:49

## 2017-09-07 RX ADMIN — Medication 650 MILLIGRAM(S): at 02:07

## 2017-09-07 RX ADMIN — Medication 1 PATCH: at 12:34

## 2017-09-07 RX ADMIN — SODIUM CHLORIDE 175 MILLILITER(S): 9 INJECTION INTRAMUSCULAR; INTRAVENOUS; SUBCUTANEOUS at 21:24

## 2017-09-07 RX ADMIN — BUDESONIDE AND FORMOTEROL FUMARATE DIHYDRATE 2 PUFF(S): 160; 4.5 AEROSOL RESPIRATORY (INHALATION) at 06:52

## 2017-09-07 RX ADMIN — Medication 100 MILLIEQUIVALENT(S): at 11:03

## 2017-09-07 RX ADMIN — Medication 100 MILLIEQUIVALENT(S): at 12:32

## 2017-09-07 RX ADMIN — Medication 3 MILLILITER(S): at 05:34

## 2017-09-07 RX ADMIN — Medication 40 MILLIEQUIVALENT(S): at 11:48

## 2017-09-07 RX ADMIN — Medication 50 MILLIGRAM(S): at 09:45

## 2017-09-07 RX ADMIN — Medication 50 MILLIGRAM(S): at 18:21

## 2017-09-07 RX ADMIN — MAGNESIUM OXIDE 400 MG ORAL TABLET 400 MILLIGRAM(S): 241.3 TABLET ORAL at 15:34

## 2017-09-07 RX ADMIN — PANTOPRAZOLE SODIUM 40 MILLIGRAM(S): 20 TABLET, DELAYED RELEASE ORAL at 06:27

## 2017-09-07 RX ADMIN — Medication 50 MILLIGRAM(S): at 12:34

## 2017-09-07 RX ADMIN — Medication 3 MILLILITER(S): at 00:23

## 2017-09-07 RX ADMIN — Medication 100 MILLIEQUIVALENT(S): at 15:32

## 2017-09-07 RX ADMIN — Medication 3 MILLILITER(S): at 17:00

## 2017-09-07 RX ADMIN — BUDESONIDE AND FORMOTEROL FUMARATE DIHYDRATE 2 PUFF(S): 160; 4.5 AEROSOL RESPIRATORY (INHALATION) at 17:52

## 2017-09-07 RX ADMIN — SODIUM CHLORIDE 175 MILLILITER(S): 9 INJECTION INTRAMUSCULAR; INTRAVENOUS; SUBCUTANEOUS at 18:21

## 2017-09-07 RX ADMIN — Medication 40 MILLIEQUIVALENT(S): at 15:37

## 2017-09-07 RX ADMIN — Medication 50 GRAM(S): at 11:02

## 2017-09-07 RX ADMIN — Medication 3 MILLILITER(S): at 23:25

## 2017-09-07 RX ADMIN — Medication 50 MILLIGRAM(S): at 23:37

## 2017-09-07 RX ADMIN — Medication 50 GRAM(S): at 15:34

## 2017-09-07 RX ADMIN — Medication 3 MILLILITER(S): at 11:01

## 2017-09-07 RX ADMIN — SODIUM CHLORIDE 175 MILLILITER(S): 9 INJECTION INTRAMUSCULAR; INTRAVENOUS; SUBCUTANEOUS at 06:28

## 2017-09-07 RX ADMIN — Medication 325 MILLIGRAM(S): at 12:34

## 2017-09-07 RX ADMIN — MAGNESIUM OXIDE 400 MG ORAL TABLET 400 MILLIGRAM(S): 241.3 TABLET ORAL at 17:52

## 2017-09-08 DIAGNOSIS — E83.42 HYPOMAGNESEMIA: ICD-10-CM

## 2017-09-08 DIAGNOSIS — F10.231 ALCOHOL DEPENDENCE WITH WITHDRAWAL DELIRIUM: ICD-10-CM

## 2017-09-08 DIAGNOSIS — E83.39 OTHER DISORDERS OF PHOSPHORUS METABOLISM: ICD-10-CM

## 2017-09-08 LAB
ALBUMIN SERPL ELPH-MCNC: 1.9 G/DL — LOW (ref 3.3–5)
ALP SERPL-CCNC: 174 U/L — HIGH (ref 40–120)
ALT FLD-CCNC: 208 U/L — HIGH (ref 12–78)
ANION GAP SERPL CALC-SCNC: 10 MMOL/L — SIGNIFICANT CHANGE UP (ref 5–17)
ANION GAP SERPL CALC-SCNC: 12 MMOL/L — SIGNIFICANT CHANGE UP (ref 5–17)
AST SERPL-CCNC: 311 U/L — HIGH (ref 15–37)
BILIRUB SERPL-MCNC: 0.8 MG/DL — SIGNIFICANT CHANGE UP (ref 0.2–1.2)
BUN SERPL-MCNC: 21 MG/DL — SIGNIFICANT CHANGE UP (ref 7–23)
BUN SERPL-MCNC: 25 MG/DL — HIGH (ref 7–23)
CALCIUM SERPL-MCNC: 6 MG/DL — CRITICAL LOW (ref 8.5–10.1)
CALCIUM SERPL-MCNC: 6.8 MG/DL — LOW (ref 8.5–10.1)
CHLORIDE SERPL-SCNC: 108 MMOL/L — SIGNIFICANT CHANGE UP (ref 96–108)
CHLORIDE SERPL-SCNC: 108 MMOL/L — SIGNIFICANT CHANGE UP (ref 96–108)
CK SERPL-CCNC: 6942 U/L — HIGH (ref 26–308)
CO2 SERPL-SCNC: 24 MMOL/L — SIGNIFICANT CHANGE UP (ref 22–31)
CO2 SERPL-SCNC: 29 MMOL/L — SIGNIFICANT CHANGE UP (ref 22–31)
CREAT SERPL-MCNC: 1.06 MG/DL — SIGNIFICANT CHANGE UP (ref 0.5–1.3)
CREAT SERPL-MCNC: 1.16 MG/DL — SIGNIFICANT CHANGE UP (ref 0.5–1.3)
DSDNA AB SER-ACNC: 13 IU/ML — SIGNIFICANT CHANGE UP
ENA JO1 AB SER-ACNC: <0.2 AI — SIGNIFICANT CHANGE UP
GLUCOSE SERPL-MCNC: 118 MG/DL — HIGH (ref 70–99)
GLUCOSE SERPL-MCNC: 79 MG/DL — SIGNIFICANT CHANGE UP (ref 70–99)
HCT VFR BLD CALC: 32.9 % — LOW (ref 39–50)
HGB BLD-MCNC: 11 G/DL — LOW (ref 13–17)
MAGNESIUM SERPL-MCNC: 0.8 MG/DL — CRITICAL LOW (ref 1.6–2.6)
MAGNESIUM SERPL-MCNC: 1.2 MG/DL — LOW (ref 1.6–2.6)
MCHC RBC-ENTMCNC: 33.3 GM/DL — SIGNIFICANT CHANGE UP (ref 32–36)
MCHC RBC-ENTMCNC: 34.6 PG — HIGH (ref 27–34)
MCV RBC AUTO: 103.7 FL — HIGH (ref 80–100)
PHOSPHATE SERPL-MCNC: 1.6 MG/DL — LOW (ref 2.5–4.5)
PLATELET # BLD AUTO: 128 K/UL — LOW (ref 150–400)
POTASSIUM SERPL-MCNC: 2.7 MMOL/L — CRITICAL LOW (ref 3.5–5.3)
POTASSIUM SERPL-MCNC: 3.2 MMOL/L — LOW (ref 3.5–5.3)
POTASSIUM SERPL-SCNC: 2.7 MMOL/L — CRITICAL LOW (ref 3.5–5.3)
POTASSIUM SERPL-SCNC: 3.2 MMOL/L — LOW (ref 3.5–5.3)
PROT SERPL-MCNC: 5.9 GM/DL — LOW (ref 6–8.3)
RBC # BLD: 3.17 M/UL — LOW (ref 4.2–5.8)
RBC # FLD: 18.2 % — HIGH (ref 11–15)
SODIUM SERPL-SCNC: 144 MMOL/L — SIGNIFICANT CHANGE UP (ref 135–145)
SODIUM SERPL-SCNC: 147 MMOL/L — HIGH (ref 135–145)
WBC # BLD: 4.7 K/UL — SIGNIFICANT CHANGE UP (ref 3.8–10.5)
WBC # FLD AUTO: 4.7 K/UL — SIGNIFICANT CHANGE UP (ref 3.8–10.5)

## 2017-09-08 PROCEDURE — 99233 SBSQ HOSP IP/OBS HIGH 50: CPT

## 2017-09-08 RX ORDER — POTASSIUM CHLORIDE 20 MEQ
10 PACKET (EA) ORAL
Qty: 0 | Refills: 0 | Status: DISCONTINUED | OUTPATIENT
Start: 2017-09-08 | End: 2017-09-08

## 2017-09-08 RX ORDER — CALCIUM GLUCONATE 100 MG/ML
1 VIAL (ML) INTRAVENOUS ONCE
Qty: 0 | Refills: 0 | Status: DISCONTINUED | OUTPATIENT
Start: 2017-09-08 | End: 2017-09-08

## 2017-09-08 RX ORDER — MAGNESIUM SULFATE 500 MG/ML
2 VIAL (ML) INJECTION ONCE
Qty: 0 | Refills: 0 | Status: DISCONTINUED | OUTPATIENT
Start: 2017-09-08 | End: 2017-09-08

## 2017-09-08 RX ORDER — SODIUM,POTASSIUM PHOSPHATES 278-250MG
1 POWDER IN PACKET (EA) ORAL
Qty: 0 | Refills: 0 | Status: COMPLETED | OUTPATIENT
Start: 2017-09-08 | End: 2017-09-09

## 2017-09-08 RX ORDER — CARVEDILOL PHOSPHATE 80 MG/1
12.5 CAPSULE, EXTENDED RELEASE ORAL EVERY 12 HOURS
Qty: 0 | Refills: 0 | Status: DISCONTINUED | OUTPATIENT
Start: 2017-09-08 | End: 2017-09-12

## 2017-09-08 RX ORDER — POTASSIUM PHOSPHATE, MONOBASIC POTASSIUM PHOSPHATE, DIBASIC 236; 224 MG/ML; MG/ML
15 INJECTION, SOLUTION INTRAVENOUS ONCE
Qty: 0 | Refills: 0 | Status: DISCONTINUED | OUTPATIENT
Start: 2017-09-08 | End: 2017-09-08

## 2017-09-08 RX ORDER — CALCIUM GLUCONATE 100 MG/ML
2 VIAL (ML) INTRAVENOUS ONCE
Qty: 0 | Refills: 0 | Status: DISCONTINUED | OUTPATIENT
Start: 2017-09-08 | End: 2017-09-08

## 2017-09-08 RX ORDER — MAGNESIUM SULFATE 500 MG/ML
2 VIAL (ML) INJECTION
Qty: 0 | Refills: 0 | Status: COMPLETED | OUTPATIENT
Start: 2017-09-08 | End: 2017-09-08

## 2017-09-08 RX ORDER — CALCIUM CARBONATE 500(1250)
3 TABLET ORAL AT BEDTIME
Qty: 0 | Refills: 0 | Status: DISCONTINUED | OUTPATIENT
Start: 2017-09-08 | End: 2017-09-12

## 2017-09-08 RX ORDER — THIAMINE MONONITRATE (VIT B1) 100 MG
100 TABLET ORAL DAILY
Qty: 0 | Refills: 0 | Status: DISCONTINUED | OUTPATIENT
Start: 2017-09-08 | End: 2017-09-12

## 2017-09-08 RX ORDER — POTASSIUM CHLORIDE 20 MEQ
40 PACKET (EA) ORAL EVERY 4 HOURS
Qty: 0 | Refills: 0 | Status: COMPLETED | OUTPATIENT
Start: 2017-09-08 | End: 2017-09-09

## 2017-09-08 RX ORDER — ALBUTEROL 90 UG/1
2 AEROSOL, METERED ORAL EVERY 6 HOURS
Qty: 0 | Refills: 0 | Status: DISCONTINUED | OUTPATIENT
Start: 2017-09-08 | End: 2017-09-12

## 2017-09-08 RX ORDER — ALBUTEROL 90 UG/1
2 AEROSOL, METERED ORAL EVERY 6 HOURS
Qty: 0 | Refills: 0 | Status: COMPLETED | OUTPATIENT
Start: 2017-09-08 | End: 2018-08-07

## 2017-09-08 RX ORDER — POTASSIUM CHLORIDE 20 MEQ
40 PACKET (EA) ORAL EVERY 4 HOURS
Qty: 0 | Refills: 0 | Status: COMPLETED | OUTPATIENT
Start: 2017-09-08 | End: 2017-09-08

## 2017-09-08 RX ORDER — FOLIC ACID 0.8 MG
1 TABLET ORAL DAILY
Qty: 0 | Refills: 0 | Status: DISCONTINUED | OUTPATIENT
Start: 2017-09-08 | End: 2017-09-12

## 2017-09-08 RX ORDER — MULTIVIT-MIN/FERROUS GLUCONATE 9 MG/15 ML
1 LIQUID (ML) ORAL DAILY
Qty: 0 | Refills: 0 | Status: DISCONTINUED | OUTPATIENT
Start: 2017-09-08 | End: 2017-09-12

## 2017-09-08 RX ORDER — CALCIUM GLUCONATE 100 MG/ML
2 VIAL (ML) INTRAVENOUS ONCE
Qty: 0 | Refills: 0 | Status: COMPLETED | OUTPATIENT
Start: 2017-09-08 | End: 2017-09-08

## 2017-09-08 RX ORDER — SPIRONOLACTONE 25 MG/1
25 TABLET, FILM COATED ORAL DAILY
Qty: 0 | Refills: 0 | Status: DISCONTINUED | OUTPATIENT
Start: 2017-09-08 | End: 2017-09-12

## 2017-09-08 RX ORDER — CALCITRIOL 0.5 UG/1
0.25 CAPSULE ORAL DAILY
Qty: 0 | Refills: 0 | Status: DISCONTINUED | OUTPATIENT
Start: 2017-09-08 | End: 2017-09-12

## 2017-09-08 RX ADMIN — PANTOPRAZOLE SODIUM 40 MILLIGRAM(S): 20 TABLET, DELAYED RELEASE ORAL at 06:06

## 2017-09-08 RX ADMIN — Medication 1 TABLET(S): at 17:14

## 2017-09-08 RX ADMIN — MAGNESIUM OXIDE 400 MG ORAL TABLET 400 MILLIGRAM(S): 241.3 TABLET ORAL at 17:14

## 2017-09-08 RX ADMIN — Medication 100 MILLIEQUIVALENT(S): at 09:10

## 2017-09-08 RX ADMIN — Medication 40 MILLIEQUIVALENT(S): at 11:05

## 2017-09-08 RX ADMIN — SPIRONOLACTONE 25 MILLIGRAM(S): 25 TABLET, FILM COATED ORAL at 13:43

## 2017-09-08 RX ADMIN — Medication 25 GRAM(S): at 11:07

## 2017-09-08 RX ADMIN — Medication 25 GRAM(S): at 22:40

## 2017-09-08 RX ADMIN — CARVEDILOL PHOSPHATE 12.5 MILLIGRAM(S): 80 CAPSULE, EXTENDED RELEASE ORAL at 17:17

## 2017-09-08 RX ADMIN — Medication 1 PATCH: at 11:06

## 2017-09-08 RX ADMIN — Medication 25 GRAM(S): at 13:42

## 2017-09-08 RX ADMIN — MAGNESIUM OXIDE 400 MG ORAL TABLET 400 MILLIGRAM(S): 241.3 TABLET ORAL at 11:08

## 2017-09-08 RX ADMIN — Medication 25 GRAM(S): at 21:11

## 2017-09-08 RX ADMIN — Medication 50 MILLIGRAM(S): at 13:41

## 2017-09-08 RX ADMIN — Medication 3 MILLILITER(S): at 11:02

## 2017-09-08 RX ADMIN — Medication 40 MILLIEQUIVALENT(S): at 13:41

## 2017-09-08 RX ADMIN — Medication 50 MILLIGRAM(S): at 21:10

## 2017-09-08 RX ADMIN — Medication 1 MILLIGRAM(S): at 17:17

## 2017-09-08 RX ADMIN — Medication 100 MILLIGRAM(S): at 17:15

## 2017-09-08 RX ADMIN — MAGNESIUM OXIDE 400 MG ORAL TABLET 400 MILLIGRAM(S): 241.3 TABLET ORAL at 09:10

## 2017-09-08 RX ADMIN — Medication 50 GRAM(S): at 10:23

## 2017-09-08 RX ADMIN — BUDESONIDE AND FORMOTEROL FUMARATE DIHYDRATE 2 PUFF(S): 160; 4.5 AEROSOL RESPIRATORY (INHALATION) at 06:06

## 2017-09-08 RX ADMIN — Medication 1 TABLET(S): at 13:38

## 2017-09-08 RX ADMIN — CALCITRIOL 0.25 MICROGRAM(S): 0.5 CAPSULE ORAL at 17:14

## 2017-09-08 RX ADMIN — Medication 1 TABLET(S): at 17:17

## 2017-09-08 RX ADMIN — Medication 40 MILLIEQUIVALENT(S): at 21:16

## 2017-09-08 RX ADMIN — BUDESONIDE AND FORMOTEROL FUMARATE DIHYDRATE 2 PUFF(S): 160; 4.5 AEROSOL RESPIRATORY (INHALATION) at 17:15

## 2017-09-08 RX ADMIN — Medication 325 MILLIGRAM(S): at 11:06

## 2017-09-08 RX ADMIN — Medication 1 PATCH: at 11:08

## 2017-09-08 NOTE — PROGRESS NOTE ADULT - PROBLEM SELECTOR PLAN 1
-Cont to monitor on telemetry  -monitor mental status  -Fall/aspiration/Seizure precaution  -[facility] protocol  -monitor vitals and pulse oxymetry   -Folic acid, mvi, thiamin daily

## 2017-09-08 NOTE — PROVIDER CONTACT NOTE (CRITICAL VALUE NOTIFICATION) - TEST AND RESULT REPORTED:
potassium 2.3; calcium 6.1; magnesium 0.9
K+ 2.7  Ca+ 6.0  Magnesium 0.8
magnesium 0.8, k+2.8, Mil 5.7
aminata meneses
Potassium 2.1  Calcium 6.2  Lactate 4.3
magnesium 1.0

## 2017-09-09 LAB
ANION GAP SERPL CALC-SCNC: 7 MMOL/L — SIGNIFICANT CHANGE UP (ref 5–17)
ANION GAP SERPL CALC-SCNC: 8 MMOL/L — SIGNIFICANT CHANGE UP (ref 5–17)
BASOPHILS # BLD AUTO: 0.2 K/UL — SIGNIFICANT CHANGE UP (ref 0–0.2)
BASOPHILS NFR BLD AUTO: 3.7 % — HIGH (ref 0–2)
BUN SERPL-MCNC: 16 MG/DL — SIGNIFICANT CHANGE UP (ref 7–23)
BUN SERPL-MCNC: 17 MG/DL — SIGNIFICANT CHANGE UP (ref 7–23)
CA-I BLD-SCNC: 1.03 MMOL/L — LOW (ref 1.12–1.3)
CALCIUM SERPL-MCNC: 6.9 MG/DL — LOW (ref 8.5–10.1)
CALCIUM SERPL-MCNC: 7 MG/DL — LOW (ref 8.5–10.1)
CHLORIDE SERPL-SCNC: 108 MMOL/L — SIGNIFICANT CHANGE UP (ref 96–108)
CHLORIDE SERPL-SCNC: 109 MMOL/L — HIGH (ref 96–108)
CK SERPL-CCNC: 4519 U/L — HIGH (ref 26–308)
CO2 SERPL-SCNC: 29 MMOL/L — SIGNIFICANT CHANGE UP (ref 22–31)
CO2 SERPL-SCNC: 30 MMOL/L — SIGNIFICANT CHANGE UP (ref 22–31)
CREAT ?TM UR-MCNC: 95 MG/DL — SIGNIFICANT CHANGE UP
CREAT SERPL-MCNC: 0.97 MG/DL — SIGNIFICANT CHANGE UP (ref 0.5–1.3)
CREAT SERPL-MCNC: 1.01 MG/DL — SIGNIFICANT CHANGE UP (ref 0.5–1.3)
EOSINOPHIL # BLD AUTO: 0.2 K/UL — SIGNIFICANT CHANGE UP (ref 0–0.5)
EOSINOPHIL NFR BLD AUTO: 3.7 % — SIGNIFICANT CHANGE UP (ref 0–6)
GLUCOSE SERPL-MCNC: 103 MG/DL — HIGH (ref 70–99)
GLUCOSE SERPL-MCNC: 93 MG/DL — SIGNIFICANT CHANGE UP (ref 70–99)
HCT VFR BLD CALC: 32.5 % — LOW (ref 39–50)
HGB BLD-MCNC: 10.7 G/DL — LOW (ref 13–17)
LYMPHOCYTES # BLD AUTO: 0.5 K/UL — LOW (ref 1–3.3)
LYMPHOCYTES # BLD AUTO: 12.3 % — LOW (ref 13–44)
MAGNESIUM SERPL-MCNC: 1.2 MG/DL — LOW (ref 1.6–2.6)
MAGNESIUM SERPL-MCNC: 1.7 MG/DL — SIGNIFICANT CHANGE UP (ref 1.6–2.6)
MCHC RBC-ENTMCNC: 32.8 GM/DL — SIGNIFICANT CHANGE UP (ref 32–36)
MCHC RBC-ENTMCNC: 33.8 PG — SIGNIFICANT CHANGE UP (ref 27–34)
MCV RBC AUTO: 102.9 FL — HIGH (ref 80–100)
MONOCYTES # BLD AUTO: 0.8 K/UL — SIGNIFICANT CHANGE UP (ref 0–0.9)
MONOCYTES NFR BLD AUTO: 15.2 % — HIGH (ref 2–14)
NEUTROPHILS # BLD AUTO: 2.9 K/UL — SIGNIFICANT CHANGE UP (ref 1.8–7.4)
NEUTROPHILS NFR BLD AUTO: 64.9 % — SIGNIFICANT CHANGE UP (ref 43–77)
PHOSPHATE SERPL-MCNC: 2.2 MG/DL — LOW (ref 2.5–4.5)
PLATELET # BLD AUTO: 148 K/UL — LOW (ref 150–400)
POTASSIUM SERPL-MCNC: 3 MMOL/L — LOW (ref 3.5–5.3)
POTASSIUM SERPL-MCNC: 3.3 MMOL/L — LOW (ref 3.5–5.3)
POTASSIUM SERPL-SCNC: 3 MMOL/L — LOW (ref 3.5–5.3)
POTASSIUM SERPL-SCNC: 3.3 MMOL/L — LOW (ref 3.5–5.3)
POTASSIUM UR-SCNC: 21 MMOL/L — SIGNIFICANT CHANGE UP
RBC # BLD: 3.16 M/UL — LOW (ref 4.2–5.8)
RBC # FLD: 18.3 % — HIGH (ref 11–15)
SODIUM SERPL-SCNC: 145 MMOL/L — SIGNIFICANT CHANGE UP (ref 135–145)
SODIUM SERPL-SCNC: 146 MMOL/L — HIGH (ref 135–145)
WBC # BLD: 4.5 K/UL — SIGNIFICANT CHANGE UP (ref 3.8–10.5)
WBC # FLD AUTO: 4.5 K/UL — SIGNIFICANT CHANGE UP (ref 3.8–10.5)

## 2017-09-09 PROCEDURE — 99232 SBSQ HOSP IP/OBS MODERATE 35: CPT

## 2017-09-09 RX ORDER — POTASSIUM CHLORIDE 20 MEQ
10 PACKET (EA) ORAL
Qty: 0 | Refills: 0 | Status: DISCONTINUED | OUTPATIENT
Start: 2017-09-09 | End: 2017-09-09

## 2017-09-09 RX ORDER — POTASSIUM CHLORIDE 20 MEQ
40 PACKET (EA) ORAL EVERY 4 HOURS
Qty: 0 | Refills: 0 | Status: DISCONTINUED | OUTPATIENT
Start: 2017-09-09 | End: 2017-09-09

## 2017-09-09 RX ORDER — MAGNESIUM OXIDE 400 MG ORAL TABLET 241.3 MG
400 TABLET ORAL
Qty: 0 | Refills: 0 | Status: DISCONTINUED | OUTPATIENT
Start: 2017-09-09 | End: 2017-09-10

## 2017-09-09 RX ORDER — SODIUM,POTASSIUM PHOSPHATES 278-250MG
1 POWDER IN PACKET (EA) ORAL
Qty: 0 | Refills: 0 | Status: COMPLETED | OUTPATIENT
Start: 2017-09-09 | End: 2017-09-10

## 2017-09-09 RX ADMIN — Medication 325 MILLIGRAM(S): at 11:28

## 2017-09-09 RX ADMIN — Medication 3 TABLET(S): at 21:51

## 2017-09-09 RX ADMIN — Medication 1 PATCH: at 11:29

## 2017-09-09 RX ADMIN — Medication 1 TABLET(S): at 12:52

## 2017-09-09 RX ADMIN — Medication 40 MILLIEQUIVALENT(S): at 06:36

## 2017-09-09 RX ADMIN — HEPARIN SODIUM 5000 UNIT(S): 5000 INJECTION INTRAVENOUS; SUBCUTANEOUS at 06:00

## 2017-09-09 RX ADMIN — Medication 50 MILLIGRAM(S): at 05:40

## 2017-09-09 RX ADMIN — CARVEDILOL PHOSPHATE 12.5 MILLIGRAM(S): 80 CAPSULE, EXTENDED RELEASE ORAL at 17:34

## 2017-09-09 RX ADMIN — Medication 1 TABLET(S): at 11:29

## 2017-09-09 RX ADMIN — SPIRONOLACTONE 25 MILLIGRAM(S): 25 TABLET, FILM COATED ORAL at 05:40

## 2017-09-09 RX ADMIN — BUDESONIDE AND FORMOTEROL FUMARATE DIHYDRATE 2 PUFF(S): 160; 4.5 AEROSOL RESPIRATORY (INHALATION) at 17:35

## 2017-09-09 RX ADMIN — Medication 100 MILLIGRAM(S): at 11:29

## 2017-09-09 RX ADMIN — Medication 40 MILLIEQUIVALENT(S): at 13:40

## 2017-09-09 RX ADMIN — BUDESONIDE AND FORMOTEROL FUMARATE DIHYDRATE 2 PUFF(S): 160; 4.5 AEROSOL RESPIRATORY (INHALATION) at 05:40

## 2017-09-09 RX ADMIN — Medication 1 TABLET(S): at 18:05

## 2017-09-09 RX ADMIN — Medication 1 MILLIGRAM(S): at 11:28

## 2017-09-09 RX ADMIN — MAGNESIUM OXIDE 400 MG ORAL TABLET 400 MILLIGRAM(S): 241.3 TABLET ORAL at 11:30

## 2017-09-09 RX ADMIN — CARVEDILOL PHOSPHATE 12.5 MILLIGRAM(S): 80 CAPSULE, EXTENDED RELEASE ORAL at 05:40

## 2017-09-09 RX ADMIN — PANTOPRAZOLE SODIUM 40 MILLIGRAM(S): 20 TABLET, DELAYED RELEASE ORAL at 06:01

## 2017-09-09 RX ADMIN — MAGNESIUM OXIDE 400 MG ORAL TABLET 400 MILLIGRAM(S): 241.3 TABLET ORAL at 17:33

## 2017-09-09 RX ADMIN — CALCITRIOL 0.25 MICROGRAM(S): 0.5 CAPSULE ORAL at 11:28

## 2017-09-09 RX ADMIN — Medication 50 MILLIGRAM(S): at 17:36

## 2017-09-09 NOTE — PROGRESS NOTE ADULT - PROBLEM SELECTOR PLAN 1
-Cont to monitor on telemetry  -monitor mental status  -Fall/aspiration/Seizure precaution  -[facility] protocol  -monitor vitals and pulse oxymetry   -Folic acid, mvi, thiamin daily

## 2017-09-10 LAB
ANION GAP SERPL CALC-SCNC: 8 MMOL/L — SIGNIFICANT CHANGE UP (ref 5–17)
BUN SERPL-MCNC: 15 MG/DL — SIGNIFICANT CHANGE UP (ref 7–23)
CALCIUM SERPL-MCNC: 7.6 MG/DL — LOW (ref 8.5–10.1)
CHLORIDE SERPL-SCNC: 108 MMOL/L — SIGNIFICANT CHANGE UP (ref 96–108)
CK SERPL-CCNC: 2875 U/L — HIGH (ref 26–308)
CO2 SERPL-SCNC: 29 MMOL/L — SIGNIFICANT CHANGE UP (ref 22–31)
CREAT SERPL-MCNC: 0.86 MG/DL — SIGNIFICANT CHANGE UP (ref 0.5–1.3)
GLUCOSE SERPL-MCNC: 99 MG/DL — SIGNIFICANT CHANGE UP (ref 70–99)
HCT VFR BLD CALC: 37.8 % — LOW (ref 39–50)
HGB BLD-MCNC: 12.1 G/DL — LOW (ref 13–17)
MAGNESIUM SERPL-MCNC: 1.1 MG/DL — LOW (ref 1.6–2.6)
MCHC RBC-ENTMCNC: 31.9 GM/DL — LOW (ref 32–36)
MCHC RBC-ENTMCNC: 34.4 PG — HIGH (ref 27–34)
MCV RBC AUTO: 107.9 FL — HIGH (ref 80–100)
PHOSPHATE SERPL-MCNC: 2.7 MG/DL — SIGNIFICANT CHANGE UP (ref 2.5–4.5)
PLATELET # BLD AUTO: 175 K/UL — SIGNIFICANT CHANGE UP (ref 150–400)
POTASSIUM SERPL-MCNC: 3.5 MMOL/L — SIGNIFICANT CHANGE UP (ref 3.5–5.3)
POTASSIUM SERPL-SCNC: 3.5 MMOL/L — SIGNIFICANT CHANGE UP (ref 3.5–5.3)
RBC # BLD: 3.51 M/UL — LOW (ref 4.2–5.8)
RBC # FLD: 18.5 % — HIGH (ref 11–15)
SODIUM SERPL-SCNC: 145 MMOL/L — SIGNIFICANT CHANGE UP (ref 135–145)
WBC # BLD: 4.9 K/UL — SIGNIFICANT CHANGE UP (ref 3.8–10.5)
WBC # FLD AUTO: 4.9 K/UL — SIGNIFICANT CHANGE UP (ref 3.8–10.5)

## 2017-09-10 PROCEDURE — 99232 SBSQ HOSP IP/OBS MODERATE 35: CPT

## 2017-09-10 RX ORDER — MAGNESIUM SULFATE 500 MG/ML
2 VIAL (ML) INJECTION ONCE
Qty: 0 | Refills: 0 | Status: COMPLETED | OUTPATIENT
Start: 2017-09-10 | End: 2017-09-10

## 2017-09-10 RX ORDER — SODIUM CHLORIDE 9 MG/ML
1000 INJECTION, SOLUTION INTRAVENOUS
Qty: 0 | Refills: 0 | Status: DISCONTINUED | OUTPATIENT
Start: 2017-09-10 | End: 2017-09-10

## 2017-09-10 RX ADMIN — BUDESONIDE AND FORMOTEROL FUMARATE DIHYDRATE 2 PUFF(S): 160; 4.5 AEROSOL RESPIRATORY (INHALATION) at 17:43

## 2017-09-10 RX ADMIN — Medication 100 MILLIGRAM(S): at 12:46

## 2017-09-10 RX ADMIN — PANTOPRAZOLE SODIUM 40 MILLIGRAM(S): 20 TABLET, DELAYED RELEASE ORAL at 06:34

## 2017-09-10 RX ADMIN — Medication 50 GRAM(S): at 12:52

## 2017-09-10 RX ADMIN — CALCITRIOL 0.25 MICROGRAM(S): 0.5 CAPSULE ORAL at 12:47

## 2017-09-10 RX ADMIN — Medication 3 TABLET(S): at 22:10

## 2017-09-10 RX ADMIN — BUDESONIDE AND FORMOTEROL FUMARATE DIHYDRATE 2 PUFF(S): 160; 4.5 AEROSOL RESPIRATORY (INHALATION) at 06:34

## 2017-09-10 RX ADMIN — Medication 1 PATCH: at 11:24

## 2017-09-10 RX ADMIN — Medication 1 TABLET(S): at 12:48

## 2017-09-10 RX ADMIN — Medication 1 TABLET(S): at 09:34

## 2017-09-10 RX ADMIN — Medication 1 PATCH: at 12:42

## 2017-09-10 RX ADMIN — Medication 50 MILLIGRAM(S): at 06:33

## 2017-09-10 RX ADMIN — CARVEDILOL PHOSPHATE 12.5 MILLIGRAM(S): 80 CAPSULE, EXTENDED RELEASE ORAL at 17:44

## 2017-09-10 RX ADMIN — SPIRONOLACTONE 25 MILLIGRAM(S): 25 TABLET, FILM COATED ORAL at 06:34

## 2017-09-10 RX ADMIN — Medication 325 MILLIGRAM(S): at 12:47

## 2017-09-10 RX ADMIN — Medication 1 MILLIGRAM(S): at 12:48

## 2017-09-10 RX ADMIN — CARVEDILOL PHOSPHATE 12.5 MILLIGRAM(S): 80 CAPSULE, EXTENDED RELEASE ORAL at 06:34

## 2017-09-10 NOTE — PROGRESS NOTE ADULT - PROBLEM SELECTOR PLAN 1
-Cont to monitor on telemetry    -Fall/aspiration/Seizure precaution    -monitor vitals and pulse oxymetry   -Folic acid, mvi, thiamin daily -Cont to monitor on telemetry  Continue    CIWA   Protocol  -Fall/aspiration/Seizure precaution    -monitor vitals and pulse oxymetry   -Folic acid, mvi, thiamin daily

## 2017-09-11 DIAGNOSIS — I50.23 ACUTE ON CHRONIC SYSTOLIC (CONGESTIVE) HEART FAILURE: ICD-10-CM

## 2017-09-11 LAB
ANION GAP SERPL CALC-SCNC: 9 MMOL/L — SIGNIFICANT CHANGE UP (ref 5–17)
BUN SERPL-MCNC: 13 MG/DL — SIGNIFICANT CHANGE UP (ref 7–23)
CALCIUM SERPL-MCNC: 8.3 MG/DL — LOW (ref 8.5–10.1)
CHLORIDE SERPL-SCNC: 105 MMOL/L — SIGNIFICANT CHANGE UP (ref 96–108)
CK SERPL-CCNC: 1365 U/L — HIGH (ref 26–308)
CO2 SERPL-SCNC: 29 MMOL/L — SIGNIFICANT CHANGE UP (ref 22–31)
CREAT SERPL-MCNC: 0.99 MG/DL — SIGNIFICANT CHANGE UP (ref 0.5–1.3)
CULTURE RESULTS: SIGNIFICANT CHANGE UP
CULTURE RESULTS: SIGNIFICANT CHANGE UP
GLUCOSE SERPL-MCNC: 89 MG/DL — SIGNIFICANT CHANGE UP (ref 70–99)
HCT VFR BLD CALC: 35.6 % — LOW (ref 39–50)
HGB BLD-MCNC: 11.5 G/DL — LOW (ref 13–17)
MAGNESIUM SERPL-MCNC: 1.1 MG/DL — LOW (ref 1.6–2.6)
MCHC RBC-ENTMCNC: 32.4 GM/DL — SIGNIFICANT CHANGE UP (ref 32–36)
MCHC RBC-ENTMCNC: 34.1 PG — HIGH (ref 27–34)
MCV RBC AUTO: 105.3 FL — HIGH (ref 80–100)
PHOSPHATE SERPL-MCNC: 3.9 MG/DL — SIGNIFICANT CHANGE UP (ref 2.5–4.5)
PLATELET # BLD AUTO: 226 K/UL — SIGNIFICANT CHANGE UP (ref 150–400)
POTASSIUM SERPL-MCNC: 3.3 MMOL/L — LOW (ref 3.5–5.3)
POTASSIUM SERPL-SCNC: 3.3 MMOL/L — LOW (ref 3.5–5.3)
RBC # BLD: 3.38 M/UL — LOW (ref 4.2–5.8)
RBC # FLD: 18 % — HIGH (ref 11–15)
SODIUM SERPL-SCNC: 143 MMOL/L — SIGNIFICANT CHANGE UP (ref 135–145)
SPECIMEN SOURCE: SIGNIFICANT CHANGE UP
SPECIMEN SOURCE: SIGNIFICANT CHANGE UP
WBC # BLD: 5.2 K/UL — SIGNIFICANT CHANGE UP (ref 3.8–10.5)
WBC # FLD AUTO: 5.2 K/UL — SIGNIFICANT CHANGE UP (ref 3.8–10.5)

## 2017-09-11 PROCEDURE — 99233 SBSQ HOSP IP/OBS HIGH 50: CPT

## 2017-09-11 RX ORDER — POTASSIUM CHLORIDE 20 MEQ
40 PACKET (EA) ORAL ONCE
Qty: 0 | Refills: 0 | Status: COMPLETED | OUTPATIENT
Start: 2017-09-11 | End: 2017-09-11

## 2017-09-11 RX ORDER — MAGNESIUM SULFATE 500 MG/ML
2 VIAL (ML) INJECTION ONCE
Qty: 0 | Refills: 0 | Status: DISCONTINUED | OUTPATIENT
Start: 2017-09-11 | End: 2017-09-11

## 2017-09-11 RX ORDER — MAGNESIUM SULFATE 500 MG/ML
2 VIAL (ML) INJECTION EVERY 4 HOURS
Qty: 0 | Refills: 0 | Status: COMPLETED | OUTPATIENT
Start: 2017-09-11 | End: 2017-09-11

## 2017-09-11 RX ORDER — FUROSEMIDE 40 MG
40 TABLET ORAL EVERY 12 HOURS
Qty: 0 | Refills: 0 | Status: COMPLETED | OUTPATIENT
Start: 2017-09-11 | End: 2017-09-11

## 2017-09-11 RX ADMIN — PANTOPRAZOLE SODIUM 40 MILLIGRAM(S): 20 TABLET, DELAYED RELEASE ORAL at 06:03

## 2017-09-11 RX ADMIN — Medication 1 PATCH: at 15:55

## 2017-09-11 RX ADMIN — Medication 100 MILLIGRAM(S): at 12:54

## 2017-09-11 RX ADMIN — Medication 40 MILLIEQUIVALENT(S): at 13:12

## 2017-09-11 RX ADMIN — BUDESONIDE AND FORMOTEROL FUMARATE DIHYDRATE 2 PUFF(S): 160; 4.5 AEROSOL RESPIRATORY (INHALATION) at 05:33

## 2017-09-11 RX ADMIN — Medication 25 GRAM(S): at 20:04

## 2017-09-11 RX ADMIN — CARVEDILOL PHOSPHATE 12.5 MILLIGRAM(S): 80 CAPSULE, EXTENDED RELEASE ORAL at 18:10

## 2017-09-11 RX ADMIN — Medication 1 TABLET(S): at 12:53

## 2017-09-11 RX ADMIN — Medication 40 MILLIGRAM(S): at 12:58

## 2017-09-11 RX ADMIN — Medication 40 MILLIGRAM(S): at 18:13

## 2017-09-11 RX ADMIN — Medication 325 MILLIGRAM(S): at 12:53

## 2017-09-11 RX ADMIN — BUDESONIDE AND FORMOTEROL FUMARATE DIHYDRATE 2 PUFF(S): 160; 4.5 AEROSOL RESPIRATORY (INHALATION) at 18:04

## 2017-09-11 RX ADMIN — Medication 40 MILLIEQUIVALENT(S): at 18:09

## 2017-09-11 RX ADMIN — CALCITRIOL 0.25 MICROGRAM(S): 0.5 CAPSULE ORAL at 12:54

## 2017-09-11 RX ADMIN — Medication 1 MILLIGRAM(S): at 12:53

## 2017-09-11 RX ADMIN — CARVEDILOL PHOSPHATE 12.5 MILLIGRAM(S): 80 CAPSULE, EXTENDED RELEASE ORAL at 05:33

## 2017-09-11 RX ADMIN — SPIRONOLACTONE 25 MILLIGRAM(S): 25 TABLET, FILM COATED ORAL at 05:33

## 2017-09-11 RX ADMIN — Medication 25 GRAM(S): at 12:59

## 2017-09-11 NOTE — PHARMACY COMMUNICATION NOTE - COMMENTS
Delia spoke with patient and states he was taking lasix orally at home. will monitor patient for reactions

## 2017-09-11 NOTE — PROGRESS NOTE ADULT - PROBLEM SELECTOR PLAN 1
-monitor mental status  -Fall/aspiration/Seizure precaution  -[facility] protocol  -monitor vitals and pulse oxymetry   -Folic acid, mvi, thiamin daily

## 2017-09-11 NOTE — PROGRESS NOTE ADULT - PROBLEM SELECTOR PLAN 5
-2/2 Lupus, ETOH, Non-compliance with diet, life style and treatment  -add Lasix 40mg IVP BID  -Monitor electrolytes  -daily wt, monitor I/O's, leg elevation, CHF education  -nutrition eval

## 2017-09-12 VITALS
DIASTOLIC BLOOD PRESSURE: 83 MMHG | RESPIRATION RATE: 17 BRPM | HEART RATE: 110 BPM | OXYGEN SATURATION: 96 % | TEMPERATURE: 97 F | SYSTOLIC BLOOD PRESSURE: 131 MMHG

## 2017-09-12 LAB
ANA PAT FLD IF-IMP: ABNORMAL
ANA TITR SER: ABNORMAL
ANION GAP SERPL CALC-SCNC: 8 MMOL/L — SIGNIFICANT CHANGE UP (ref 5–17)
BUN SERPL-MCNC: 15 MG/DL — SIGNIFICANT CHANGE UP (ref 7–23)
CALCIUM SERPL-MCNC: 8.5 MG/DL — SIGNIFICANT CHANGE UP (ref 8.5–10.1)
CHLORIDE SERPL-SCNC: 107 MMOL/L — SIGNIFICANT CHANGE UP (ref 96–108)
CO2 SERPL-SCNC: 29 MMOL/L — SIGNIFICANT CHANGE UP (ref 22–31)
CREAT SERPL-MCNC: 0.87 MG/DL — SIGNIFICANT CHANGE UP (ref 0.5–1.3)
GLUCOSE SERPL-MCNC: 97 MG/DL — SIGNIFICANT CHANGE UP (ref 70–99)
MAGNESIUM SERPL-MCNC: 1.2 MG/DL — LOW (ref 1.6–2.6)
POTASSIUM SERPL-MCNC: 3.5 MMOL/L — SIGNIFICANT CHANGE UP (ref 3.5–5.3)
POTASSIUM SERPL-SCNC: 3.5 MMOL/L — SIGNIFICANT CHANGE UP (ref 3.5–5.3)
SODIUM SERPL-SCNC: 144 MMOL/L — SIGNIFICANT CHANGE UP (ref 135–145)

## 2017-09-12 PROCEDURE — 99233 SBSQ HOSP IP/OBS HIGH 50: CPT

## 2017-09-12 RX ORDER — FUROSEMIDE 40 MG
40 TABLET ORAL EVERY 12 HOURS
Qty: 0 | Refills: 0 | Status: DISCONTINUED | OUTPATIENT
Start: 2017-09-12 | End: 2017-09-12

## 2017-09-12 RX ORDER — CALCITRIOL 0.5 UG/1
1 CAPSULE ORAL
Qty: 0 | Refills: 0 | COMMUNITY
Start: 2017-09-12

## 2017-09-12 RX ORDER — MAGNESIUM OXIDE 400 MG ORAL TABLET 241.3 MG
400 TABLET ORAL
Qty: 0 | Refills: 0 | Status: DISCONTINUED | OUTPATIENT
Start: 2017-09-12 | End: 2017-09-12

## 2017-09-12 RX ORDER — MAGNESIUM SULFATE 500 MG/ML
2 VIAL (ML) INJECTION
Qty: 0 | Refills: 0 | Status: COMPLETED | OUTPATIENT
Start: 2017-09-12 | End: 2017-09-12

## 2017-09-12 RX ORDER — MAGNESIUM SULFATE 500 MG/ML
1 VIAL (ML) INJECTION ONCE
Qty: 0 | Refills: 0 | Status: DISCONTINUED | OUTPATIENT
Start: 2017-09-12 | End: 2017-09-12

## 2017-09-12 RX ORDER — FOLIC ACID 0.8 MG
1 TABLET ORAL
Qty: 0 | Refills: 0 | COMMUNITY
Start: 2017-09-12

## 2017-09-12 RX ORDER — THIAMINE MONONITRATE (VIT B1) 100 MG
1 TABLET ORAL
Qty: 0 | Refills: 0 | COMMUNITY
Start: 2017-09-12

## 2017-09-12 RX ORDER — MAGNESIUM OXIDE 400 MG ORAL TABLET 241.3 MG
1 TABLET ORAL
Qty: 0 | Refills: 0 | COMMUNITY
Start: 2017-09-12

## 2017-09-12 RX ORDER — FUROSEMIDE 40 MG
1 TABLET ORAL
Qty: 30 | Refills: 0 | OUTPATIENT
Start: 2017-09-12 | End: 2017-10-12

## 2017-09-12 RX ORDER — NICOTINE POLACRILEX 2 MG
1 GUM BUCCAL
Qty: 27 | Refills: 0 | OUTPATIENT
Start: 2017-09-12 | End: 2017-10-12

## 2017-09-12 RX ORDER — POTASSIUM CHLORIDE 20 MEQ
40 PACKET (EA) ORAL EVERY 4 HOURS
Qty: 0 | Refills: 0 | Status: DISCONTINUED | OUTPATIENT
Start: 2017-09-12 | End: 2017-09-12

## 2017-09-12 RX ORDER — SPIRONOLACTONE 25 MG/1
1 TABLET, FILM COATED ORAL
Qty: 30 | Refills: 0 | OUTPATIENT
Start: 2017-09-12 | End: 2017-10-12

## 2017-09-12 RX ORDER — MULTIVIT-MIN/FERROUS GLUCONATE 9 MG/15 ML
1 LIQUID (ML) ORAL
Qty: 0 | Refills: 0 | COMMUNITY
Start: 2017-09-12

## 2017-09-12 RX ADMIN — SPIRONOLACTONE 25 MILLIGRAM(S): 25 TABLET, FILM COATED ORAL at 05:47

## 2017-09-12 RX ADMIN — Medication 40 MILLIEQUIVALENT(S): at 09:32

## 2017-09-12 RX ADMIN — MAGNESIUM OXIDE 400 MG ORAL TABLET 400 MILLIGRAM(S): 241.3 TABLET ORAL at 10:50

## 2017-09-12 RX ADMIN — PANTOPRAZOLE SODIUM 40 MILLIGRAM(S): 20 TABLET, DELAYED RELEASE ORAL at 06:06

## 2017-09-12 RX ADMIN — CARVEDILOL PHOSPHATE 12.5 MILLIGRAM(S): 80 CAPSULE, EXTENDED RELEASE ORAL at 05:47

## 2017-09-12 RX ADMIN — Medication 325 MILLIGRAM(S): at 10:50

## 2017-09-12 RX ADMIN — BUDESONIDE AND FORMOTEROL FUMARATE DIHYDRATE 2 PUFF(S): 160; 4.5 AEROSOL RESPIRATORY (INHALATION) at 05:47

## 2017-09-12 RX ADMIN — CALCITRIOL 0.25 MICROGRAM(S): 0.5 CAPSULE ORAL at 10:50

## 2017-09-12 RX ADMIN — Medication 1 TABLET(S): at 10:50

## 2017-09-12 RX ADMIN — Medication 25 GRAM(S): at 10:51

## 2017-09-12 RX ADMIN — Medication 1 PATCH: at 10:50

## 2017-09-12 RX ADMIN — Medication 1 MILLIGRAM(S): at 10:50

## 2017-09-12 RX ADMIN — Medication 100 MILLIGRAM(S): at 10:52

## 2017-09-12 RX ADMIN — Medication 25 GRAM(S): at 10:45

## 2017-09-12 NOTE — DISCHARGE NOTE ADULT - CARE PLAN
Principal Discharge DX:	Alcohol withdrawal with delirium  Goal:	Resolved, s/p detox  Instructions for follow-up, activity and diet:	cont ton mvi, thiamine, folic. follow up pmd. abstinence from alcohol discussed with pt  Secondary Diagnosis:	Acute on chronic systolic (congestive) heart failure  Goal:	2/2 Lupus, ETOH, Non-compliance with diet, life style and treatment  Instructions for follow-up, activity and diet:	cont on B-blocker, ARB, Lasix. follow up with pmd  Secondary Diagnosis:	Hypokalemia  Instructions for follow-up, activity and diet:	supplemented, monitor labs with pmd  Secondary Diagnosis:	Non-traumatic rhabdomyolysis  Instructions for follow-up, activity and diet:	monitor labs with pmd  Secondary Diagnosis:	Atrial fibrillation, unspecified type  Instructions for follow-up, activity and diet:	Coreg 12.5 mg BID. was on Coumadin until Jan 2017 then was taken off by his pmd.  Secondary Diagnosis:	Chronic obstructive pulmonary disease, unspecified COPD type  Instructions for follow-up, activity and diet:	stable  Secondary Diagnosis:	Tobacco abuse  Instructions for follow-up, activity and diet:	nicotine patch. smoking cessation education provided

## 2017-09-12 NOTE — PROGRESS NOTE ADULT - PROBLEM SELECTOR PROBLEM 9
Tobacco abuse
Systemic lupus erythematosus, unspecified SLE type, unspecified organ involvement status
Tobacco abuse

## 2017-09-12 NOTE — PROGRESS NOTE ADULT - PROBLEM SELECTOR PLAN 5
-2/2 Lupus, ETOH, Non-compliance with diet, life style and treatment  -Lasix 40mg IVP BID  -Monitor electrolytes  -daily wt, monitor I/O's, leg elevation, CHF education  -nutrition eval

## 2017-09-12 NOTE — DISCHARGE NOTE ADULT - PATIENT PORTAL LINK FT
“You can access the Follow[facility] Patient Portal, offered by [facility], by registering with the following website: [email or web address]”

## 2017-09-12 NOTE — PROGRESS NOTE ADULT - PROBLEM SELECTOR PLAN 9
-nicotine patch
hold plaquenil because of rhabdomyolysis  dr colin of rheum consulted
-nicotine patch

## 2017-09-12 NOTE — PROGRESS NOTE ADULT - ASSESSMENT
38 yo obese male with pmh COPD, chronic ETOH, Lupus, AFIB, chronic abd pain, chronic diarrhea/constipation, presents with dizziness. Pt also with LE edema x 1 week seen by PMD placed on Lasix. Being managed for ETOH withdrawal, electrolytes imbalance, Rhabdo and SANDRA
COPD   SANDRA  Rhabdomyolysis    Recommendation    continue bronchodilators   oxygen saturation ok on RA  continue current treatment   Pulmonary signig off. Please reconsult PRN
38 yo obese male with pmh COPD, chronic ETOH, Lupus, AFIB, chronic abd pain, chronic diarrhea/constipation, presents with dizziness. Pt also with LE edema x 1 week seen by PMD placed on Lasix. Being managed for ETOH withdrawal, electrolytes imbalance, Rhabdo and SANDRA
38yo obese male with pmh COPD, chronic ETOH, Lupus, AFIB previously on coumadin (taken off on Jan 2017 - ? LV thrombus), chronic abd pain, chronic diarrhea/constipation, presents with dizziness. Pt also with LE edema x 1 week seen by PMD placed on Lasix. Being managed for ETOH withdrawal, electrolytes imbalance, Rhabdo and SANDRA

## 2017-09-12 NOTE — PROGRESS NOTE ADULT - PROBLEM SELECTOR PROBLEM 2
Non-traumatic rhabdomyolysis
SANDRA (acute kidney injury)
Non-traumatic rhabdomyolysis

## 2017-09-12 NOTE — PROGRESS NOTE ADULT - PROBLEM SELECTOR PLAN 1
-monitor mental status  -Fall/aspiration/Seizure precaution  -[facility] protocol  -monitor vitals and pulse oxymetry   -Folic acid, mvi, thiamin daily

## 2017-09-12 NOTE — DISCHARGE NOTE ADULT - MEDICATION SUMMARY - MEDICATIONS TO TAKE
I will START or STAY ON the medications listed below when I get home from the hospital:    spironolactone 25 mg oral tablet  -- 1 tab(s) by mouth once a day  -- Indication: For Acute on chronic systolic (congestive) heart failure    Altace 10 mg oral capsule  -- 1 cap(s) by mouth once a day  -- Indication: For Acute on chronic systolic (congestive) heart failure    Zocor 20 mg oral tablet  -- 1 tab(s) by mouth once a day (at bedtime)  -- Indication: For Hld    Plaquenil 200 mg oral tablet  -- 1 tab(s) by mouth 2 times a day  -- Indication: For Systemic lupus erythematosus, unspecified SLE type, unspecified organ involvement status    Coreg 25 mg oral tablet  -- 1 tab(s) by mouth 2 times a day  -- Indication: For Htn    Ventolin HFA CFC free 90 mcg/inh inhalation aerosol  -- 2 puff(s) inhaled 4 times a day, As Needed for wheezing  -- For inhalation only.  It is very important that you take or use this exactly as directed.  Do not skip doses or discontinue unless directed by your doctor.  Obtain medical advice before taking any non-prescription drugs as some may affect the action of this medication.  Shake well before use.    -- Indication: For Chronic obstructive pulmonary disease, unspecified COPD type    Norvasc 5 mg oral tablet  -- 1 tab(s) by mouth 2 times a day  -- Indication: For Acute on chronic systolic (congestive) heart failure    furosemide 40 mg oral tablet  -- 1 tab(s) by mouth once a day   -- Avoid prolonged or excessive exposure to direct and/or artificial sunlight while taking this medication.  It is very important that you take or use this exactly as directed.  Do not skip doses or discontinue unless directed by your doctor.  It may be advisable to drink a full glass orange juice or eat a banana daily while taking this medication.    -- Indication: For Acute on chronic systolic (congestive) heart failure    ferrous sulfate 325 mg (65 mg elemental iron) oral delayed release tablet  -- 1 tab(s) by mouth once a day  -- Indication: For miriam    magnesium oxide 400 mg (241.3 mg elemental magnesium) oral tablet  -- 1 tab(s) by mouth 3 times a day (with meals)  -- Indication: For Hypomagnesemia    Protonix 40 mg oral delayed release tablet  -- 1 tab(s) by mouth once a day  -- Indication: For gerd    nicotine 7 mg/24 hr transdermal film, extended release  -- 1 patch by transdermal patch once a day   -- Indication: For Smoker    Multiple Vitamins with Minerals oral tablet  -- 1 tab(s) by mouth once a day  -- Indication: For Etoh    thiamine 100 mg oral tablet  -- 1 tab(s) by mouth once a day  -- Indication: For Etoh    folic acid 1 mg oral tablet  -- 1 tab(s) by mouth once a day  -- Indication: For Etoh    calcitriol 0.25 mcg oral capsule  -- 1 cap(s) by mouth once a day  -- Indication: For preventive measure

## 2017-09-12 NOTE — PROGRESS NOTE ADULT - PROBLEM SELECTOR PROBLEM 1
Alcohol withdrawal with delirium
Alcohol withdrawal with delirium
Non-traumatic rhabdomyolysis
Alcohol withdrawal with delirium

## 2017-09-12 NOTE — DISCHARGE NOTE ADULT - SECONDARY DIAGNOSIS.
Hypokalemia Non-traumatic rhabdomyolysis Atrial fibrillation, unspecified type Chronic obstructive pulmonary disease, unspecified COPD type Tobacco abuse Acute on chronic systolic (congestive) heart failure

## 2017-09-12 NOTE — PROGRESS NOTE ADULT - PROBLEM SELECTOR PROBLEM 8
Atrial fibrillation, unspecified type
Elevated LFTs
Atrial fibrillation, unspecified type

## 2017-09-12 NOTE — DIETITIAN INITIAL EVALUATION ADULT. - ENERGY NEEDS
Height (cm): 177.8 (09-06)  Weight (kg): 119.6 (09-06)  BMI (kg/m2): 37.8 (09-06)  Ideal Body Weight: 75.5kg+/-10%  % Ideal Body Weight: 158%

## 2017-09-12 NOTE — PROGRESS NOTE ADULT - PROBLEM SELECTOR PROBLEM 7
Chronic obstructive pulmonary disease, unspecified COPD type
Tobacco abuse
Chronic obstructive pulmonary disease, unspecified COPD type

## 2017-09-12 NOTE — PROGRESS NOTE ADULT - SUBJECTIVE AND OBJECTIVE BOX
Patient is a 37y old  Male who presents with a chief complaint of seizure/dizziness (06 Sep 2017 15:04)      INTERVAL HPI/OVERNIGHT EVENTS:  Pt   seen   at   bed   side   MEDICATIONS  (STANDING):  pantoprazole    Tablet 40 milliGRAM(s) Oral before breakfast  ferrous    sulfate 325 milliGRAM(s) Oral daily  heparin  Injectable 5000 Unit(s) SubCutaneous every 12 hours  buDESOnide 160 MICROgram(s)/formoterol 4.5 MICROgram(s) Inhaler 2 Puff(s) Inhalation two times a day  nicotine -   7 mG/24Hr(s) Patch 1 patch Transdermal daily  chlordiazePOXIDE  milliGRAM(s) Oral   spironolactone 25 milliGRAM(s) Oral daily  calcitriol   Capsule 0.25 MICROGram(s) Oral daily  calcium carbonate 500 mG (Tums) Chewable 3 Tablet(s) Chew at bedtime  thiamine 100 milliGRAM(s) Oral daily  multivitamin/minerals 1 Tablet(s) Oral daily  folic acid 1 milliGRAM(s) Oral daily  carvedilol 12.5 milliGRAM(s) Oral every 12 hours  magnesium oxide 400 milliGRAM(s) Oral three times a day with meals  potassium chloride  10 mEq/100 mL IVPB 10 milliEquivalent(s) IV Intermittent every 1 hour    MEDICATIONS  (PRN):  ALBUTerol    90 MICROgram(s) HFA Inhaler 2 Puff(s) Inhalation every 6 hours PRN Shortness of Breath and/or Wheezing      Allergies    sulfa drugs (Unknown)    Intolerances        Vital Signs Last 24 Hrs  T(C): 36.1 (09 Sep 2017 11:51), Max: 36.9 (08 Sep 2017 19:40)  T(F): 97 (09 Sep 2017 11:51), Max: 98.4 (08 Sep 2017 19:40)  HR: 106 (09 Sep 2017 11:51) (105 - 106)  BP: 129/85 (09 Sep 2017 11:51) (128/62 - 129/85)  BP(mean): --  RR: 17 (09 Sep 2017 11:51) (17 - 20)  SpO2: 98% (09 Sep 2017 11:51) (98% - 100%)    PHYSICAL EXAM:  Pt   is   Morbidly   Obese   &   A X O  X 3   Heart  - S1 , S2  +   Lung-  B/L   air   entry   ABD- S1, S2   +  EXT -   Edema   pr   LABS:                        10.7   4.5   )-----------( 148      ( 09 Sep 2017 07:11 )             32.5     09-09    146<H>  |  108  |  17  ----------------------------<  103<H>  3.0<L>   |  30  |  0.97    Ca    7.0<L>      09 Sep 2017 07:11  Phos  2.2     09-09  Mg     1.7     09-09    TPro  5.9<L>  /  Alb  1.9<L>  /  TBili  0.8  /  DBili  x   /  AST  311<H>  /  ALT  208<H>  /  AlkPhos  174<H>  09-08        CAPILLARY BLOOD GLUCOSE        Lipid panel:   CARDIAC MARKERS ( 09 Sep 2017 07:11 )  x     / x     / 4519 U/L / x     / x      CARDIAC MARKERS ( 08 Sep 2017 06:18 )  x     / x     / 6942 U/L / x     / x              TSH     RADIOLOGY & ADDITIONAL TESTS:    Imaging Personally Reviewed:  [ ] YES  [ ] NO    Consultant(s) Notes Reviewed:  [ ] YES  [ ] NO    Care Discussed with Consultants/Other Providers [ ] YES  [ ] NO
CHIEF COMPLAINT/INTERVAL HISTORY:    Patient is a 37y old  Male who presents with a chief complaint of seizure/dizziness (06 Sep 2017 15:04)      HPI:  38yo obese male with pmh COPD, chronic etoh, lupus, h/o afib previously on coumadin (taken off on 2017 - ? LV thrombus), chronic abd pain, chronic diarrhea/constipation, presents with dizziness noted today, unable to stand up. Pt also with LE edema x 1 week seen by PMD placed on lasix. Denies CP, sob, worsenind abd pain, worsening diarrhea/constipation. Pt reports occasonial BRB 2/2 to hemorrhoids, but denies any in past month. Last drink today, drinks daily. Pt reports seizure like episode 2 days ago, did not go to hospital and denies any change in etoh intake.  Wife reports he was in sleep, sat up, screamed, held left arm, then eyes rolled back, shook all over, + tongue bleeding for a few minutes. No urinary/bowel incontinence. No h/o seizure per family.  Pt feels legs heavy but that edema has improved. (06 Sep 2017 15:04)    Overnight issues  renal and liver function improved  cpk improved  still with marked electrolyte abnormalities   SUBJECTIVE & OBJECTIVE: Pt seen and examined at bedside.   ROS:  CONSTITUTIONAL: No fever, weight loss, or fatigue  NECK: No pain or stiffness  RESPIRATORY: No cough, wheezing, chills or hemoptysis; No shortness of breath  CARDIOVASCULAR: No chest pain, palpitations, dizziness, or leg swelling  GASTROINTESTINAL: No abdominal or epigastric pain. No nausea, vomiting, or hematemesis; No diarrhea or constipation. No melena or hematochezia.  GENITOURINARY: No dysuria, frequency, hematuria, or incontinence  NEUROLOGICAL: No headaches, memory loss, loss of strength, numbness, or tremors  SKIN: No itching, burning, rashes, or lesions   ICU Vital Signs Last 24 Hrs  T(C): 37.1 (07 Sep 2017 11:46), Max: 37.7 (07 Sep 2017 04:57)  T(F): 98.8 (07 Sep 2017 11:46), Max: 99.8 (07 Sep 2017 04:57)  HR: 106 (07 Sep 2017 11:46) (89 - 106)  BP: 128/80 (07 Sep 2017 11:46) (109/53 - 137/94)  BP(mean): --  ABP: --  ABP(mean): --  RR: 20 (07 Sep 2017 11:46) (16 - 23)  SpO2: 95% (07 Sep 2017 11:46) (86% - 98%)        MEDICATIONS  (STANDING):  sodium chloride 0.9%. 1000 milliLiter(s) (175 mL/Hr) IV Continuous <Continuous>  pantoprazole    Tablet 40 milliGRAM(s) Oral before breakfast  ferrous    sulfate 325 milliGRAM(s) Oral daily  ALBUTerol/ipratropium for Nebulization 3 milliLiter(s) Nebulizer every 6 hours  heparin  Injectable 5000 Unit(s) SubCutaneous every 12 hours  buDESOnide 160 MICROgram(s)/formoterol 4.5 MICROgram(s) Inhaler 2 Puff(s) Inhalation two times a day  nicotine -   7 mG/24Hr(s) Patch 1 patch Transdermal daily  chlordiazePOXIDE 50 milliGRAM(s) Oral every 6 hours  chlordiazePOXIDE  milliGRAM(s) Oral   magnesium sulfate  IVPB 2 Gram(s) IV Intermittent every 4 hours  potassium chloride  10 mEq/100 mL IVPB 10 milliEquivalent(s) IV Intermittent every 1 hour  potassium chloride    Tablet ER 40 milliEquivalent(s) Oral every 4 hours    MEDICATIONS  (PRN):        PHYSICAL EXAM:    GENERAL: NAD, well-groomed, well-developed  HEAD:  Atraumatic, Normocephalic  EYES: EOMI, PERRLA, conjunctiva and sclera clear  ENMT: Moist mucous membranes  NECK: Supple, No JVD  NERVOUS SYSTEM:  Alert & Oriented X3, Motor Strength 5/5 B/L upper and lower extremities; DTRs 2+ intact and symmetric  CHEST/LUNG: Clear to auscultation bilaterally; No rales, rhonchi, wheezing, or rubs  HEART: Regular rate and rhythm; No murmurs, rubs, or gallops  ABDOMEN: Soft, Nontender, Nondistended; Bowel sounds present  EXTREMITIES:  2+ Peripheral Pulses, No clubbing, cyanosis, or edema    LABS:                        12.2   5.8   )-----------( 146      ( 06 Sep 2017 09:55 )             36.5     09-07    142  |  99  |  37<H>  ----------------------------<  70  2.3<LL>   |  31  |  1.59<H>    Ca    6.1<LL>      07 Sep 2017 08:09  Phos  2.3       Mg     1.0         TPro  5.9<L>  /  Alb  1.9<L>  /  TBili  0.9  /  DBili  x   /  AST  381<H>  /  ALT  220<H>  /  AlkPhos  180<H>      PT/INR - ( 06 Sep 2017 09:55 )   PT: 11.8 sec;   INR: 1.08 ratio         PTT - ( 06 Sep 2017 09:55 )  PTT:28.2 sec  Urinalysis Basic - ( 06 Sep 2017 11:12 )    Color: Yellow / Appearance: Clear / S.015 / pH: x  Gluc: x / Ketone: Negative  / Bili: Negative / Urobili: 1 mg/dL   Blood: x / Protein: 15 mg/dL / Nitrite: Negative   Leuk Esterase: Trace / RBC: 6-10 /HPF / WBC 3-5   Sq Epi: x / Non Sq Epi: Few / Bacteria: x        CAPILLARY BLOOD GLUCOSE          RECENT CULTURES:      RADIOLOGY & ADDITIONAL TESTS:  Imaging Personally Reviewed:  [ ] YES      Consultant(s) Notes Reviewed:  [ ] YES     Care Discussed with [ ] Consultants [X ] Patient [ ] Family  [x ]    [x ]  Other; RN  HEALTH ISSUES - PROBLEM Dx:  Alcohol abuse: Alcohol abuse  Systemic lupus erythematosus, unspecified SLE type, unspecified organ involvement status: Systemic lupus erythematosus, unspecified SLE type, unspecified organ involvement status  Elevated LFTs: Elevated LFTs  Tobacco abuse: Tobacco abuse  Hypokalemia: Hypokalemia  Atrial fibrillation, unspecified type: Atrial fibrillation, unspecified type  Chronic obstructive pulmonary disease, unspecified COPD type: Chronic obstructive pulmonary disease, unspecified COPD type  ATN (acute tubular necrosis): ATN (acute tubular necrosis)  SANDRA (acute kidney injury): SANDRA (acute kidney injury)  Non-traumatic rhabdomyolysis: Non-traumatic rhabdomyolysis        DVT/GI ppx  Discussed with pt @ bedside
Patient is a 37y old  Male who presents with a chief complaint of seizure/dizziness (06 Sep 2017 15:04)      OVERNIGHT EVENTS: None    REVIEW OF SYSTEMS: denies chest pain/SOB, diaphoresis, no F/C, cough, dizziness, headache, blurry vision, nausea, vomiting, abdominal pain. All others review of systems negative     MEDICATIONS  (STANDING):  pantoprazole    Tablet 40 milliGRAM(s) Oral before breakfast  ferrous    sulfate 325 milliGRAM(s) Oral daily  ALBUTerol/ipratropium for Nebulization 3 milliLiter(s) Nebulizer every 6 hours  heparin  Injectable 5000 Unit(s) SubCutaneous every 12 hours  buDESOnide 160 MICROgram(s)/formoterol 4.5 MICROgram(s) Inhaler 2 Puff(s) Inhalation two times a day  nicotine -   7 mG/24Hr(s) Patch 1 patch Transdermal daily  chlordiazePOXIDE 50 milliGRAM(s) Oral every 8 hours  chlordiazePOXIDE  milliGRAM(s) Oral   magnesium oxide 400 milliGRAM(s) Oral three times a day with meals  magnesium sulfate  IVPB 2 Gram(s) IV Intermittent every 2 hours  potassium chloride    Tablet ER 40 milliEquivalent(s) Oral every 4 hours  spironolactone 25 milliGRAM(s) Oral daily  calcitriol   Capsule 0.25 MICROGram(s) Oral daily  calcium carbonate 500 mG (Tums) Chewable 3 Tablet(s) Chew at bedtime  potassium acid phosphate/sodium acid phosphate tablet (K-PHOS No. 2) 1 Tablet(s) Oral three times a day with meals    MEDICATIONS  (PRN):      Allergies    sulfa drugs (Unknown)    Intolerances        T(F): 98.8 (09-08-17 @ 06:00), Max: 99.8 (09-07-17 @ 23:08)  HR: 100 (09-08-17 @ 11:02) (78 - 110)  BP: 122/65 (09-08-17 @ 06:00) (116/65 - 138/91)  RR: 18 (09-08-17 @ 06:00) (17 - 18)  SpO2: 96% (09-08-17 @ 11:02) (95% - 97%)  Wt(kg): --    PHYSICAL EXAM:  GENERAL: NAD, well-groomed, well-developed  HEAD:  Atraumatic, Normocephalic  EYES: EOMI, PERRLA, conjunctiva and sclera clear  ENMT: No tonsillar erythema, exudates, or enlargement; Moist mucous membranes, Good dentition, No lesions, no tongue fasciculation   NECK: Supple, No JVD, Normal thyroid  NERVOUS SYSTEM:  Alert & Oriented X3, Good concentration; Motor Strength 5/5 B/L upper and lower extremities; DTRs 2+ intact and symmetric, mild BL Hands tremors  CHEST/LUNG: Clear to percussion bilaterally; No rales, rhonchi, wheezing, or rubs BL  HEART: irreg/irreg  ABDOMEN: Soft, Nontender, Nondistended; Bowel sounds present  EXTREMITIES:  2+ Peripheral Pulses, No clubbing, cyanosis, or edema BL LE  LYMPH: No lymphadenopathy noted  SKIN: No rashes or lesions    LABS:                        11.0   4.7   )-----------( 128      ( 08 Sep 2017 06:18 )             32.9     09-08    147<H>  |  108  |  25<H>  ----------------------------<  79  2.7<LL>   |  29  |  1.06    Ca    6.0<LL>      08 Sep 2017 06:18  Phos  1.6     09-08  Mg     0.8     09-08    TPro  5.9<L>  /  Alb  1.9<L>  /  TBili  0.8  /  DBili  x   /  AST  311<H>  /  ALT  208<H>  /  AlkPhos  174<H>  09-08        Cultures;   CAPILLARY BLOOD GLUCOSE        Lipid panel:     CARDIAC MARKERS ( 08 Sep 2017 06:18 )  x     / x     / 6942 U/L / x     / x      CARDIAC MARKERS ( 07 Sep 2017 08:09 )  x     / x     / 9429 U/L / x     / x            RADIOLOGY & ADDITIONAL TESTS:    Imaging Personally Reviewed:  [x ] YES      CTH- < from: CT Head No Cont (09.06.17 @ 14:43) >  mild cerebral volume loss and involutional change, commensurate with   age. No acute cerebral pathology suggested.    < end of copied text >  CT A/P- < from: CT Abdomen and Pelvis No Cont (09.06.17 @ 13:30) >  Diffuse fatty infiltration of the liver.    Questionable mild pericolonic fat stranding involving the mid sigmoid   colon. Correlate clinically for diverticulitis.     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    < end of copied text >      Consultant(s) Notes Reviewed:  [x ] YES     Care Discussed with [x ] Consultants [X ] Patient [ x] Family  [x ]    [x ]  Other; RN
Patient is a 37y old  Male who presents with a chief complaint of seizure/dizziness (06 Sep 2017 15:04)      OVERNIGHT EVENTS: none    REVIEW OF SYSTEMS: denies chest pain/SOB, diaphoresis, no F/C, cough, dizziness, headache, blurry vision, nausea, vomiting, abdominal pain. All others review of systems negative     MEDICATIONS  (STANDING):  pantoprazole    Tablet 40 milliGRAM(s) Oral before breakfast  ferrous    sulfate 325 milliGRAM(s) Oral daily  heparin  Injectable 5000 Unit(s) SubCutaneous every 12 hours  buDESOnide 160 MICROgram(s)/formoterol 4.5 MICROgram(s) Inhaler 2 Puff(s) Inhalation two times a day  nicotine -   7 mG/24Hr(s) Patch 1 patch Transdermal daily  chlordiazePOXIDE  milliGRAM(s) Oral   chlordiazePOXIDE 50 milliGRAM(s) Oral once  spironolactone 25 milliGRAM(s) Oral daily  calcitriol   Capsule 0.25 MICROGram(s) Oral daily  calcium carbonate 500 mG (Tums) Chewable 3 Tablet(s) Chew at bedtime  thiamine 100 milliGRAM(s) Oral daily  multivitamin/minerals 1 Tablet(s) Oral daily  folic acid 1 milliGRAM(s) Oral daily  carvedilol 12.5 milliGRAM(s) Oral every 12 hours  magnesium oxide 400 milliGRAM(s) Oral three times a day with meals  potassium chloride    Tablet ER 40 milliEquivalent(s) Oral every 4 hours  furosemide   Injectable 40 milliGRAM(s) IV Push every 12 hours    MEDICATIONS  (PRN):  ALBUTerol    90 MICROgram(s) HFA Inhaler 2 Puff(s) Inhalation every 6 hours PRN Shortness of Breath and/or Wheezing      Allergies    sulfa drugs (Unknown)    Intolerances        T(F): 97.4 (09-12-17 @ 11:12), Max: 98.8 (09-11-17 @ 17:14)  HR: 110 (09-12-17 @ 11:12) (90 - 110)  BP: 131/83 (09-12-17 @ 11:12) (123/76 - 145/74)  RR: 17 (09-12-17 @ 11:12) (17 - 20)  SpO2: 96% (09-12-17 @ 11:12) (96% - 99%)  Wt(kg): --    PHYSICAL EXAM:  GENERAL: NAD, well-groomed, well-developed, morbidly obese  HEAD:  Atraumatic, Normocephalic  EYES: EOMI, PERRLA, conjunctiva and sclera clear  ENMT: No tonsillar erythema, exudates, or enlargement; Moist mucous membranes, Good dentition, No lesions  NECK: Supple, No JVD, Normal thyroid  NERVOUS SYSTEM:  Alert & Oriented X3, Good concentration; Motor Strength 5/5 B/L upper and lower extremities; DTRs 2+ intact and symmetric  CHEST/LUNG: Clear to percussion bilaterally; No rales, rhonchi, wheezing, or rubs BL  HEART: Regular rate and rhythm; No murmurs, rubs, or gallops  ABDOMEN: Soft, Nontender, Nondistended; Bowel sounds present  EXTREMITIES:  <2+ Peripheral edema BL LE  LYMPH: No lymphadenopathy noted  SKIN: No rashes or lesions      LABS:                        11.5   5.2   )-----------( 226      ( 11 Sep 2017 06:42 )             35.6     09-12    144  |  107  |  15  ----------------------------<  97  3.5   |  29  |  0.87    Ca    8.5      12 Sep 2017 07:26  Phos  3.9     09-11  Mg     1.2     09-12          Cultures;   CAPILLARY BLOOD GLUCOSE        Lipid panel:     CARDIAC MARKERS ( 11 Sep 2017 06:42 )  x     / x     / 1365 U/L / x     / x            RADIOLOGY & ADDITIONAL TESTS:    Imaging Personally Reviewed:  [x ] YES      Consultant(s) Notes Reviewed:  [x ] YES     Care Discussed with [x ] Consultants [X ] Patient [ ] Family  [x ]    [x ]  Other; RN
Patient seen in follow up for SANDRA severe elcetrolyte imbalance. Patient upset wants to go home.     MEDICATIONS  (STANDING):  pantoprazole    Tablet 40 milliGRAM(s) Oral before breakfast  ferrous    sulfate 325 milliGRAM(s) Oral daily  heparin  Injectable 5000 Unit(s) SubCutaneous every 12 hours  buDESOnide 160 MICROgram(s)/formoterol 4.5 MICROgram(s) Inhaler 2 Puff(s) Inhalation two times a day  nicotine -   7 mG/24Hr(s) Patch 1 patch Transdermal daily  chlordiazePOXIDE  milliGRAM(s) Oral   chlordiazePOXIDE 50 milliGRAM(s) Oral once  spironolactone 25 milliGRAM(s) Oral daily  calcitriol   Capsule 0.25 MICROGram(s) Oral daily  calcium carbonate 500 mG (Tums) Chewable 3 Tablet(s) Chew at bedtime  thiamine 100 milliGRAM(s) Oral daily  multivitamin/minerals 1 Tablet(s) Oral daily  folic acid 1 milliGRAM(s) Oral daily  carvedilol 12.5 milliGRAM(s) Oral every 12 hours  magnesium oxide 400 milliGRAM(s) Oral three times a day with meals  potassium chloride    Tablet ER 40 milliEquivalent(s) Oral every 4 hours  furosemide   Injectable 40 milliGRAM(s) IV Push every 12 hours    MEDICATIONS  (PRN):  ALBUTerol    90 MICROgram(s) HFA Inhaler 2 Puff(s) Inhalation every 6 hours PRN Shortness of Breath and/or Wheezing    PHYSICAL EXAM:      T(C): 36.3 (09-12-17 @ 11:12), Max: 37.1 (09-11-17 @ 17:14)  HR: 110 (09-12-17 @ 11:12) (90 - 110)  BP: 131/83 (09-12-17 @ 11:12) (123/76 - 145/74)  RR: 17 (09-12-17 @ 11:12) (17 - 20)  SpO2: 96% (09-12-17 @ 11:12) (96% - 99%)  Wt(kg): --  Respiratory: refused exam  Cardiovascular n/a  Gastrointestinal: n/a  Extremities:  visual 2 + edema                                    11.5   5.2   )-----------( 226      ( 11 Sep 2017 06:42 )             35.6     09-12    144  |  107  |  15  ----------------------------<  97  3.5   |  29  |  0.87    Ca    8.5      12 Sep 2017 07:26  Phos  3.9     09-11  Mg     1.2     09-12            Assessment and Plan:    SANDRA rhabdomyolysis; severe electrolyte imbalance; alcoholism;   Persistently low Mg suspected secondary to total body depletion and alcohol induced tubular injury.  Explained to patient the need for IV repletion with PO supplement as he is high risk for continued Mg loss due to his underlying alcohol abuse, medications and history.  He is refusing to stay and is signing out AMA.  He is aware of the risk for hypomagnesemia induced cardiac arrhythmia and death.  Advised to have basic panel and Mg checked tomorrow.
Patient seen in follow up for SANDRA, electrolyte imbalance.      MEDICATIONS  (STANDING):  pantoprazole    Tablet 40 milliGRAM(s) Oral before breakfast  ferrous    sulfate 325 milliGRAM(s) Oral daily  heparin  Injectable 5000 Unit(s) SubCutaneous every 12 hours  buDESOnide 160 MICROgram(s)/formoterol 4.5 MICROgram(s) Inhaler 2 Puff(s) Inhalation two times a day  nicotine -   7 mG/24Hr(s) Patch 1 patch Transdermal daily  chlordiazePOXIDE  milliGRAM(s) Oral   chlordiazePOXIDE 50 milliGRAM(s) Oral once  spironolactone 25 milliGRAM(s) Oral daily  calcitriol   Capsule 0.25 MICROGram(s) Oral daily  calcium carbonate 500 mG (Tums) Chewable 3 Tablet(s) Chew at bedtime  thiamine 100 milliGRAM(s) Oral daily  multivitamin/minerals 1 Tablet(s) Oral daily  folic acid 1 milliGRAM(s) Oral daily  carvedilol 12.5 milliGRAM(s) Oral every 12 hours  magnesium sulfate  IVPB 2 Gram(s) IV Intermittent every 4 hours  potassium chloride    Tablet ER 40 milliEquivalent(s) Oral once  furosemide   Injectable 40 milliGRAM(s) IV Push every 12 hours    MEDICATIONS  (PRN):  ALBUTerol    90 MICROgram(s) HFA Inhaler 2 Puff(s) Inhalation every 6 hours PRN Shortness of Breath and/or Wheezing    PHYSICAL EXAM:      T(C): 36.7 (09-11-17 @ 14:02), Max: 37.1 (09-10-17 @ 23:49)  HR: 90 (09-11-17 @ 14:02) (90 - 119)  BP: 126/82 (09-11-17 @ 14:02) (126/82 - 141/82)  RR: 18 (09-11-17 @ 14:02) (17 - 18)  SpO2: 98% (09-11-17 @ 14:02) (95% - 98%)  Wt(kg): --  Respiratory: clear anteriorly, decreased BS at bases  Cardiovascular: S1 S2  Gastrointestinal: soft NT ND +BS  Extremities:  2 edema                                    11.5   5.2   )-----------( 226      ( 11 Sep 2017 06:42 )             35.6     09-11    143  |  105  |  13  ----------------------------<  89  3.3<L>   |  29  |  0.99    Ca    8.3<L>      11 Sep 2017 06:42  Phos  3.9     09-11  Mg     1.1     09-11            Assessment and Plan:  SANDRA rhabdomyolysis; electrolyte imbalance.  Add Lasix IV; replete K and Mg.
Patient seen in follow up for SANDRA; electrolyte imbalance. no acute distress.    MEDICATIONS  (STANDING):  pantoprazole    Tablet 40 milliGRAM(s) Oral before breakfast  ferrous    sulfate 325 milliGRAM(s) Oral daily  heparin  Injectable 5000 Unit(s) SubCutaneous every 12 hours  buDESOnide 160 MICROgram(s)/formoterol 4.5 MICROgram(s) Inhaler 2 Puff(s) Inhalation two times a day  nicotine -   7 mG/24Hr(s) Patch 1 patch Transdermal daily  chlordiazePOXIDE 50 milliGRAM(s) Oral every 8 hours  chlordiazePOXIDE  milliGRAM(s) Oral   magnesium oxide 400 milliGRAM(s) Oral three times a day with meals  spironolactone 25 milliGRAM(s) Oral daily  calcitriol   Capsule 0.25 MICROGram(s) Oral daily  calcium carbonate 500 mG (Tums) Chewable 3 Tablet(s) Chew at bedtime  potassium acid phosphate/sodium acid phosphate tablet (K-PHOS No. 2) 1 Tablet(s) Oral three times a day with meals  thiamine 100 milliGRAM(s) Oral daily  multivitamin/minerals 1 Tablet(s) Oral daily  folic acid 1 milliGRAM(s) Oral daily  carvedilol 12.5 milliGRAM(s) Oral every 12 hours    MEDICATIONS  (PRN):  ALBUTerol    90 MICROgram(s) HFA Inhaler 2 Puff(s) Inhalation every 6 hours PRN Shortness of Breath and/or Wheezing    PHYSICAL EXAM:      T(C): 36.9 (09-08-17 @ 12:17), Max: 37.7 (09-07-17 @ 23:08)  HR: 104 (09-08-17 @ 12:17) (78 - 110)  BP: 132/92 (09-08-17 @ 12:17) (116/65 - 138/91)  RR: 18 (09-08-17 @ 12:17) (17 - 18)  SpO2: 94% (09-08-17 @ 12:17) (94% - 97%)  Wt(kg): --  Respiratory: clear anteriorly, decreased BS at bases  Cardiovascular: S1 S2  Gastrointestinal: soft NT ND +BS  Extremities:   1-2 edema soft                                    11.0   4.7   )-----------( 128      ( 08 Sep 2017 06:18 )             32.9     09-08    147<H>  |  108  |  25<H>  ----------------------------<  79  2.7<LL>   |  29  |  1.06    Ca    6.0<LL>      08 Sep 2017 06:18  Phos  1.6     09-08  Mg     0.8     09-08    TPro  5.9<L>  /  Alb  1.9<L>  /  TBili  0.8  /  DBili  x   /  AST  311<H>  /  ALT  208<H>  /  AlkPhos  174<H>  09-08    ABG - ( 06 Sep 2017 18:41 )  pH: x     /  pCO2: 39    /  pO2: 58    / HCO3: 32    / Base Excess: 8.6   /  SaO2: 89                LIVER FUNCTIONS - ( 08 Sep 2017 06:18 )  Alb: 1.9 g/dL / Pro: 5.9 gm/dL / ALK PHOS: 174 U/L / ALT: 208 U/L / AST: 311 U/L / GGT: x             Assessment and Plan:  SANDRA; rhabdomyolysis; ETOH/ hypokalemic induced suspected; improving.  D/C IVF as exacerbating low Mg; K; phos; edema;  CPK <10,000; replete lytes  Will follow.
Subjective: no complaints. Denies SOB      MEDICATIONS  (STANDING):  sodium chloride 0.9%. 1000 milliLiter(s) (175 mL/Hr) IV Continuous <Continuous>  pantoprazole    Tablet 40 milliGRAM(s) Oral before breakfast  ferrous    sulfate 325 milliGRAM(s) Oral daily  ALBUTerol/ipratropium for Nebulization 3 milliLiter(s) Nebulizer every 6 hours  heparin  Injectable 5000 Unit(s) SubCutaneous every 12 hours  buDESOnide 160 MICROgram(s)/formoterol 4.5 MICROgram(s) Inhaler 2 Puff(s) Inhalation two times a day  nicotine -   7 mG/24Hr(s) Patch 1 patch Transdermal daily    MEDICATIONS  (PRN):          T(C): 37.7 (17 @ 04:57), Max: 37.7 (17 @ 04:57)  HR: 98 (17 @ 05:34) (84 - 103)  BP: 130/85 (17 @ 04:57) (65/41 - 137/94)  RR: 20 (17 @ 04:57) (16 - 30)  SpO2: 95% (17 @ 05:34) (86% - 98%)  Wt(kg): --    ABG - ( 06 Sep 2017 18:41 )  pH: x     /  pCO2: 39    /  pO2: 58    / HCO3: 32    / Base Excess: 8.6   /  SaO2: 89                  I&O's Detail    06 Sep 2017 07:01  -  07 Sep 2017 07:00  --------------------------------------------------------  IN:  Total IN: 0 mL    OUT:    Voided: 300 mL  Total OUT: 300 mL    Total NET: -300 mL               PHYSICAL EXAM:    GENERAL: comfortable  EYES: EOMI, PERRLA, conjunctiva and sclera clear  NECK: Supple, no inc in JVP  CHEST/LUNG: Clear  HEART: S1S2  ABDOMEN: Soft, Nontender, Nondistended; Bowel sounds present  EXTREMITIES:  no edema  NEURO: no asterixis      LABS:  CBC Full  -  ( 06 Sep 2017 09:55 )  WBC Count : 5.8 K/uL  Hemoglobin : 12.2 g/dL  Hematocrit : 36.5 %  Platelet Count - Automated : 146 K/uL  Mean Cell Volume : 98.9 fl  Mean Cell Hemoglobin : 33.2 pg  Mean Cell Hemoglobin Concentration : 33.6 gm/dL  Auto Neutrophil # : 4.2 K/uL  Auto Lymphocyte # : 0.6 K/uL  Auto Monocyte # : 0.7 K/uL  Auto Eosinophil # : 0.1 K/uL  Auto Basophil # : 0.1 K/uL  Auto Neutrophil % : 73.0 %  Auto Lymphocyte % : 11.0 %  Auto Monocyte % : 11.5 %  Auto Eosinophil % : 1.9 %  Auto Basophil % : 2.6 %        140  |  97  |  42<H>  ----------------------------<  77  2.8<LL>   |  30  |  2.42<H>    Ca    6.2<LL>      06 Sep 2017 18:06  Phos  3.9       Mg     0.8         TPro  6.5  /  Alb  2.0<L>  /  TBili  0.8  /  DBili  x   /  AST  530<H>  /  ALT  262<H>  /  AlkPhos  215<H>      PT/INR - ( 06 Sep 2017 09:55 )   PT: 11.8 sec;   INR: 1.08 ratio         PTT - ( 06 Sep 2017 09:55 )  PTT:28.2 sec  Urinalysis Basic - ( 06 Sep 2017 11:12 )    Color: Yellow / Appearance: Clear / S.015 / pH: x  Gluc: x / Ketone: Negative  / Bili: Negative / Urobili: 1 mg/dL   Blood: x / Protein: 15 mg/dL / Nitrite: Negative   Leuk Esterase: Trace / RBC: 6-10 /HPF / WBC 3-5   Sq Epi: x / Non Sq Epi: Few / Bacteria: x          ASSESSMENT and PLAN:    * SANDRA -- no hydron on CT. Rather bland urine on presentation. Doubt LN. Likely pre-renal + pigment induced ATN. Cont aggressive crystalloid. Follow repeat Cr, CPK  * Rhabdo -- traumatic, hypoK induced. Cont to supplement K PRN. Maintain Saline
patient without any complaints     Vital Signs Last 24 Hrs  T(C): 36.9 (08 Sep 2017 19:40), Max: 37.7 (07 Sep 2017 23:08)  T(F): 98.4 (08 Sep 2017 19:40), Max: 99.8 (07 Sep 2017 23:08)  HR: 105 (08 Sep 2017 19:40) (78 - 109)  BP: 128/62 (08 Sep 2017 19:40) (116/65 - 138/91)  BP(mean): --  RR: 20 (08 Sep 2017 19:40) (17 - 20)  SpO2: 100% (08 Sep 2017 19:40) (94% - 100%)  Physical Exam  patient lying in bed in no respiratory distress  HEENT - EOMI, PERRLA ,neck supple , No JVD  lungs - decreased BS, no wheezing , ronchi or rales   COR- [id number] , no murmer  Abd- soft, BS+, no tenderness, no guarding   ext- ecc neg, good pulses  Neuro - Alert , oriented X3   Skin- No rashes   MEDICATIONS  (STANDING):  pantoprazole    Tablet 40 milliGRAM(s) Oral before breakfast  ferrous    sulfate 325 milliGRAM(s) Oral daily  heparin  Injectable 5000 Unit(s) SubCutaneous every 12 hours  buDESOnide 160 MICROgram(s)/formoterol 4.5 MICROgram(s) Inhaler 2 Puff(s) Inhalation two times a day  nicotine -   7 mG/24Hr(s) Patch 1 patch Transdermal daily  chlordiazePOXIDE 50 milliGRAM(s) Oral every 8 hours  chlordiazePOXIDE  milliGRAM(s) Oral   magnesium oxide 400 milliGRAM(s) Oral three times a day with meals  spironolactone 25 milliGRAM(s) Oral daily  calcitriol   Capsule 0.25 MICROGram(s) Oral daily  calcium carbonate 500 mG (Tums) Chewable 3 Tablet(s) Chew at bedtime  potassium acid phosphate/sodium acid phosphate tablet (K-PHOS No. 2) 1 Tablet(s) Oral three times a day with meals  thiamine 100 milliGRAM(s) Oral daily  multivitamin/minerals 1 Tablet(s) Oral daily  folic acid 1 milliGRAM(s) Oral daily  carvedilol 12.5 milliGRAM(s) Oral every 12 hours  potassium chloride    Tablet ER 40 milliEquivalent(s) Oral every 4 hours  magnesium sulfate  IVPB 2 Gram(s) IV Intermittent every 2 hours    MEDICATIONS  (PRN):  ALBUTerol    90 MICROgram(s) HFA Inhaler 2 Puff(s) Inhalation every 6 hours PRN Shortness of Breath and/or Wheezing                        11.0   4.7   )-----------( 128      ( 08 Sep 2017 06:18 )             32.9   09-08    144  |  108  |  21  ----------------------------<  118<H>  3.2<L>   |  24  |  1.16    Ca    6.8<L>      08 Sep 2017 17:53  Phos  1.6     09-08  Mg     1.2     09-08    TPro  5.9<L>  /  Alb  1.9<L>  /  TBili  0.8  /  DBili  x   /  AST  311<H>  /  ALT  208<H>  /  AlkPhos  174<H>  09-08  CARDIAC MARKERS ( 08 Sep 2017 06:18 )  x     / x     / 6942 U/L / x     / x      CARDIAC MARKERS ( 07 Sep 2017 08:09 )  x     / x     / 9429 U/L / x     / x
Patient is a 37y old  Male who presents with a chief complaint of seizure/dizziness (06 Sep 2017 15:04)      INTERVAL HPI/OVERNIGHT EVENTS:  Pt    seen    at    bed    side   MEDICATIONS  (STANDING):  pantoprazole    Tablet 40 milliGRAM(s) Oral before breakfast  ferrous    sulfate 325 milliGRAM(s) Oral daily  heparin  Injectable 5000 Unit(s) SubCutaneous every 12 hours  buDESOnide 160 MICROgram(s)/formoterol 4.5 MICROgram(s) Inhaler 2 Puff(s) Inhalation two times a day  nicotine -   7 mG/24Hr(s) Patch 1 patch Transdermal daily  chlordiazePOXIDE  milliGRAM(s) Oral   chlordiazePOXIDE 50 milliGRAM(s) Oral once  spironolactone 25 milliGRAM(s) Oral daily  calcitriol   Capsule 0.25 MICROGram(s) Oral daily  calcium carbonate 500 mG (Tums) Chewable 3 Tablet(s) Chew at bedtime  thiamine 100 milliGRAM(s) Oral daily  multivitamin/minerals 1 Tablet(s) Oral daily  folic acid 1 milliGRAM(s) Oral daily  carvedilol 12.5 milliGRAM(s) Oral every 12 hours  magnesium oxide 400 milliGRAM(s) Oral three times a day with meals  potassium acid phosphate/sodium acid phosphate tablet (K-PHOS No. 2) 1 Tablet(s) Oral two times a day with meals    MEDICATIONS  (PRN):  ALBUTerol    90 MICROgram(s) HFA Inhaler 2 Puff(s) Inhalation every 6 hours PRN Shortness of Breath and/or Wheezing      Allergies    sulfa drugs (Unknown)    Intolerances          Vital Signs Last 24 Hrs  T(C): 36.3 (10 Sep 2017 05:22), Max: 36.4 (10 Sep 2017 00:35)  T(F): 97.4 (10 Sep 2017 05:22), Max: 97.6 (10 Sep 2017 00:35)  HR: 118 (10 Sep 2017 05:22) (106 - 118)  BP: 141/98 (10 Sep 2017 05:22) (129/85 - 141/98)  BP(mean): --  RR: 18 (10 Sep 2017 05:22) (17 - 18)  SpO2: 98% (10 Sep 2017 05:22) (96% - 98%)    PHYSICAL EXAM:  pt   is   Morbidly   obese  &   A  X O   X 3   Heart -   S1 , S2   +   Lung -  B/L  good   air   entry   ABD -  Obese  ,   soft   , BS +   EXT -    Edema   pr   LABS:                        12.1   4.9   )-----------( 175      ( 10 Sep 2017 06:34 )             37.8     09-10    145  |  108  |  15  ----------------------------<  99  3.5   |  29  |  0.86    Ca    7.6<L>      10 Sep 2017 06:34  Phos  2.7     09-10  Mg     1.1     09-10          CAPILLARY BLOOD GLUCOSE        Lipid panel:   CARDIAC MARKERS ( 10 Sep 2017 06:34 )  x     / x     / 2875 U/L / x     / x      CARDIAC MARKERS ( 09 Sep 2017 07:11 )  x     / x     / 4519 U/L / x     / x              TSH     RADIOLOGY & ADDITIONAL TESTS:    Imaging Personally Reviewed:  [ ] YES  [ ] NO    Consultant(s) Notes Reviewed:  [ ] YES  [ ] NO    Care Discussed with Consultants/Other Providers [ ] YES  [ ] NO
Patient is a 37y old  Male who presents with a chief complaint of seizure/dizziness (06 Sep 2017 15:04)      OVERNIGHT EVENTS: C/O BL feet swelling     REVIEW OF SYSTEMS: denies chest pain/SOB, diaphoresis, no F/C, cough, dizziness, headache, blurry vision, nausea, vomiting, abdominal pain. All others review of systems negative     MEDICATIONS  (STANDING):  pantoprazole    Tablet 40 milliGRAM(s) Oral before breakfast  ferrous    sulfate 325 milliGRAM(s) Oral daily  heparin  Injectable 5000 Unit(s) SubCutaneous every 12 hours  buDESOnide 160 MICROgram(s)/formoterol 4.5 MICROgram(s) Inhaler 2 Puff(s) Inhalation two times a day  nicotine -   7 mG/24Hr(s) Patch 1 patch Transdermal daily  chlordiazePOXIDE  milliGRAM(s) Oral   chlordiazePOXIDE 50 milliGRAM(s) Oral once  spironolactone 25 milliGRAM(s) Oral daily  calcitriol   Capsule 0.25 MICROGram(s) Oral daily  calcium carbonate 500 mG (Tums) Chewable 3 Tablet(s) Chew at bedtime  thiamine 100 milliGRAM(s) Oral daily  multivitamin/minerals 1 Tablet(s) Oral daily  folic acid 1 milliGRAM(s) Oral daily  carvedilol 12.5 milliGRAM(s) Oral every 12 hours  magnesium sulfate  IVPB 2 Gram(s) IV Intermittent every 4 hours  potassium chloride    Tablet ER 40 milliEquivalent(s) Oral once  potassium chloride    Tablet ER 40 milliEquivalent(s) Oral once  furosemide   Injectable 40 milliGRAM(s) IV Push every 12 hours    MEDICATIONS  (PRN):  ALBUTerol    90 MICROgram(s) HFA Inhaler 2 Puff(s) Inhalation every 6 hours PRN Shortness of Breath and/or Wheezing      Allergies    sulfa drugs (Unknown)    Intolerances        T(F): 97.6 (09-11-17 @ 05:37), Max: 98.8 (09-10-17 @ 23:49)  HR: 119 (09-11-17 @ 05:37) (105 - 119)  BP: 140/67 (09-11-17 @ 05:37) (136/99 - 141/82)  RR: 18 (09-11-17 @ 05:37) (17 - 18)  SpO2: 95% (09-11-17 @ 05:37) (95% - 98%)  Wt(kg): --    PHYSICAL EXAM:  GENERAL: NAD, well-groomed, well-developed, morbidly obese  HEAD:  Atraumatic, Normocephalic  EYES: EOMI, PERRLA, conjunctiva and sclera clear  ENMT: No tonsillar erythema, exudates, or enlargement; Moist mucous membranes, Good dentition, No lesions  NECK: Supple, No JVD, Normal thyroid  NERVOUS SYSTEM:  Alert & Oriented X3, Good concentration; Motor Strength 5/5 B/L upper and lower extremities; DTRs 2+ intact and symmetric  CHEST/LUNG: Clear to percussion bilaterally; No rales, rhonchi, wheezing, or rubs BL  HEART: Regular rate and rhythm; No murmurs, rubs, or gallops  ABDOMEN: Soft, Nontender, Nondistended; Bowel sounds present  EXTREMITIES:  2+ Peripheral edema BL LE  LYMPH: No lymphadenopathy noted  SKIN: No rashes or lesions    LABS:                        11.5   5.2   )-----------( 226      ( 11 Sep 2017 06:42 )             35.6     09-11    143  |  105  |  13  ----------------------------<  89  3.3<L>   |  29  |  0.99    Ca    8.3<L>      11 Sep 2017 06:42  Phos  3.9     09-11  Mg     1.1     09-11          Cultures;   CAPILLARY BLOOD GLUCOSE        Lipid panel:     CARDIAC MARKERS ( 11 Sep 2017 06:42 )  x     / x     / 1365 U/L / x     / x      CARDIAC MARKERS ( 10 Sep 2017 06:34 )  x     / x     / 2875 U/L / x     / x            RADIOLOGY & ADDITIONAL TESTS:    Imaging Personally Reviewed:  [x ] YES      Consultant(s) Notes Reviewed:  [x ] YES     Care Discussed with [x ] Consultants [X ] Patient [ ] Family  [x ]    [x ]  Other; RN

## 2017-09-12 NOTE — PROGRESS NOTE ADULT - PROBLEM SELECTOR PLAN 8
-Coreg 12.5 mg BID  -was on Coumadin until Jan 2017 then was taken off by his pmd
-telemetry monitoring   -Resume Coreg but lower the dose to 12.5 mg BID  -Coumadin tonight, check INR
-telemetry monitoring   -Resume Coreg but lower the dose to 12.5 mg BID  -Coumadin tonight, check INR
hold statin
-Coreg 12.5 mg BID  -was on Coumadin until Jan 2017 then was taken off by his pmd

## 2017-09-12 NOTE — DIETITIAN INITIAL EVALUATION ADULT. - PERTINENT MEDS FT
heparin, lasix, Mag-ox, KCl, Librium, proventil, rocaltrol, tums, coreg, aldactone, MVI/minerals, vit B1, ferrous sulfate, symbicort, protonix

## 2017-09-12 NOTE — DIETITIAN INITIAL EVALUATION ADULT. - OTHER INFO
pt seen due to LOS and MD consult for acute CHF (education). pt presents c good appetite and po intake. denies N/V. Has his natural teeth, missing a bunch; no difficulty chewing/swallowing. pt says he doesn't have CHF, sees a cardiologist regularly and he wouldve known about it.  he says he never eats salty foods and doesn't use salt when he cooks. reports his legs are still swollen.

## 2017-09-12 NOTE — DISCHARGE NOTE ADULT - PLAN OF CARE
cont on B-blocker, ARB, Lasix. follow up with pmd supplemented, monitor labs with pmd monitor labs with pmd Coreg 12.5 mg BID. was on Coumadin until Jan 2017 then was taken off by his pmd. stable nicotine patch. smoking cessation education provided Resolved, s/p detox cont ton mvi, thiamine, folic. follow up pmd. abstinence from alcohol discussed with pt 2/2 Lupus, ETOH, Non-compliance with diet, life style and treatment

## 2017-09-12 NOTE — DIETITIAN INITIAL EVALUATION ADULT. - PERTINENT LABORATORY DATA
09-12 Na144 mmol/L Glu 97 mg/dL K+ 3.5 mmol/L Cr  0.87 mg/dL BUN 15 mg/dL Phos n/a   Alb n/a   PAB n/a

## 2017-09-12 NOTE — CHART NOTE - NSCHARTNOTEFT_GEN_A_CORE
Patient is a 37y old  Male who presents with a chief complaint of seizure/dizziness (06 Sep 2017 15:04)   requesting to leave hospital AMA. Pt explained risks of leaving AMA including worsening of symptoms and risk of death. Pt. verbalized understanding.   risks have been explained at length to the patient by myself, Dr Arellano and Dr Rasmussen.  Patient verbalizes understanding of those risks and is still leaving AMA.  discharge paperwork unable to accessed so was completed on downtime forms.   Patient did not require prescriptions at this time .

## 2017-09-12 NOTE — DIETITIAN INITIAL EVALUATION ADULT. - SIGNS/SYMPTOMS
dx of acute CHF; LE edema x 1 wk PTA, edema 1+ hortencia legs (9/11); noncompliant c diet (as per MD)

## 2017-09-12 NOTE — PROGRESS NOTE ADULT - PROBLEM SELECTOR PLAN 10
-hold Plaquenil because of rhabdomyolysis  -follow up dr colin of rheum recs
-hold Plaquenil because of rhabdomyolysis  -follow up dr colin of rheum recs
[facility] monitoring
-hold Plaquenil because of rhabdomyolysis  -follow up dr colin of rheum recs
-hold Plaquenil because of rhabdomyolysis  -follow up dr colin of rheum recs

## 2017-09-12 NOTE — PROGRESS NOTE ADULT - PROVIDER SPECIALTY LIST ADULT
Hospitalist
Nephrology
Pulmonology
Hospitalist

## 2017-09-12 NOTE — PROGRESS NOTE ADULT - PROBLEM SELECTOR PROBLEM 10
Alcohol abuse
Systemic lupus erythematosus, unspecified SLE type, unspecified organ involvement status

## 2017-09-14 DIAGNOSIS — N17.0 ACUTE KIDNEY FAILURE WITH TUBULAR NECROSIS: ICD-10-CM

## 2017-09-14 DIAGNOSIS — M62.82 RHABDOMYOLYSIS: ICD-10-CM

## 2017-09-14 DIAGNOSIS — G47.33 OBSTRUCTIVE SLEEP APNEA (ADULT) (PEDIATRIC): ICD-10-CM

## 2017-09-14 DIAGNOSIS — Z91.11 PATIENT'S NONCOMPLIANCE WITH DIETARY REGIMEN: ICD-10-CM

## 2017-09-14 DIAGNOSIS — R56.9 UNSPECIFIED CONVULSIONS: ICD-10-CM

## 2017-09-14 DIAGNOSIS — I95.9 HYPOTENSION, UNSPECIFIED: ICD-10-CM

## 2017-09-14 DIAGNOSIS — E83.42 HYPOMAGNESEMIA: ICD-10-CM

## 2017-09-14 DIAGNOSIS — D69.6 THROMBOCYTOPENIA, UNSPECIFIED: ICD-10-CM

## 2017-09-14 DIAGNOSIS — Z88.2 ALLERGY STATUS TO SULFONAMIDES: ICD-10-CM

## 2017-09-14 DIAGNOSIS — Z91.14 PATIENT'S OTHER NONCOMPLIANCE WITH MEDICATION REGIMEN: ICD-10-CM

## 2017-09-14 DIAGNOSIS — Z72.0 TOBACCO USE: ICD-10-CM

## 2017-09-14 DIAGNOSIS — F10.231 ALCOHOL DEPENDENCE WITH WITHDRAWAL DELIRIUM: ICD-10-CM

## 2017-09-14 DIAGNOSIS — Z53.29 PROCEDURE AND TREATMENT NOT CARRIED OUT BECAUSE OF PATIENT'S DECISION FOR OTHER REASONS: ICD-10-CM

## 2017-09-14 DIAGNOSIS — E87.6 HYPOKALEMIA: ICD-10-CM

## 2017-09-14 DIAGNOSIS — J44.9 CHRONIC OBSTRUCTIVE PULMONARY DISEASE, UNSPECIFIED: ICD-10-CM

## 2017-09-14 DIAGNOSIS — I48.91 UNSPECIFIED ATRIAL FIBRILLATION: ICD-10-CM

## 2017-09-14 DIAGNOSIS — E66.01 MORBID (SEVERE) OBESITY DUE TO EXCESS CALORIES: ICD-10-CM

## 2017-09-14 DIAGNOSIS — I50.23 ACUTE ON CHRONIC SYSTOLIC (CONGESTIVE) HEART FAILURE: ICD-10-CM

## 2017-09-14 DIAGNOSIS — Z28.21 IMMUNIZATION NOT CARRIED OUT BECAUSE OF PATIENT REFUSAL: ICD-10-CM

## 2017-09-14 DIAGNOSIS — L93.2 OTHER LOCAL LUPUS ERYTHEMATOSUS: ICD-10-CM

## 2017-09-14 DIAGNOSIS — Q60.0 RENAL AGENESIS, UNILATERAL: ICD-10-CM

## 2017-09-14 DIAGNOSIS — E83.39 OTHER DISORDERS OF PHOSPHORUS METABOLISM: ICD-10-CM

## 2018-03-03 ENCOUNTER — INPATIENT (INPATIENT)
Facility: HOSPITAL | Age: 38
LOS: 2 days | Discharge: HOME | End: 2018-03-06
Attending: HOSPITALIST | Admitting: HOSPITALIST
Payer: COMMERCIAL

## 2018-03-03 VITALS
RESPIRATION RATE: 19 BRPM | HEIGHT: 70 IN | HEART RATE: 125 BPM | SYSTOLIC BLOOD PRESSURE: 138 MMHG | TEMPERATURE: 100 F | OXYGEN SATURATION: 93 % | WEIGHT: 250 LBS | DIASTOLIC BLOOD PRESSURE: 97 MMHG

## 2018-03-03 DIAGNOSIS — Z98.89 OTHER SPECIFIED POSTPROCEDURAL STATES: Chronic | ICD-10-CM

## 2018-03-03 DIAGNOSIS — Z72.0 TOBACCO USE: ICD-10-CM

## 2018-03-03 DIAGNOSIS — F10.230 ALCOHOL DEPENDENCE WITH WITHDRAWAL, UNCOMPLICATED: ICD-10-CM

## 2018-03-03 DIAGNOSIS — I10 ESSENTIAL (PRIMARY) HYPERTENSION: ICD-10-CM

## 2018-03-03 DIAGNOSIS — H59.022 CATARACT (LENS) FRAGMENTS IN EYE FOLLOWING CATARACT SURGERY, LEFT EYE: Chronic | ICD-10-CM

## 2018-03-03 DIAGNOSIS — M32.9 SYSTEMIC LUPUS ERYTHEMATOSUS, UNSPECIFIED: ICD-10-CM

## 2018-03-03 DIAGNOSIS — J44.1 CHRONIC OBSTRUCTIVE PULMONARY DISEASE WITH (ACUTE) EXACERBATION: ICD-10-CM

## 2018-03-03 LAB
ALBUMIN SERPL ELPH-MCNC: 2.2 G/DL — LOW (ref 3.3–5)
ALBUMIN SERPL ELPH-MCNC: 2.5 G/DL — LOW (ref 3.3–5)
ALP SERPL-CCNC: 201 U/L — HIGH (ref 40–120)
ALP SERPL-CCNC: 245 U/L — HIGH (ref 40–120)
ALT FLD-CCNC: 113 U/L — HIGH (ref 12–78)
ALT FLD-CCNC: 138 U/L — HIGH (ref 12–78)
ANION GAP SERPL CALC-SCNC: 13 MMOL/L — SIGNIFICANT CHANGE UP (ref 5–17)
APTT BLD: 40.2 SEC — HIGH (ref 27.5–37.4)
AST SERPL-CCNC: 129 U/L — HIGH (ref 15–37)
AST SERPL-CCNC: 180 U/L — HIGH (ref 15–37)
BASOPHILS # BLD AUTO: 0 K/UL — SIGNIFICANT CHANGE UP (ref 0–0.2)
BASOPHILS NFR BLD AUTO: 0 % — SIGNIFICANT CHANGE UP (ref 0–2)
BILIRUB DIRECT SERPL-MCNC: 0.29 MG/DL — HIGH (ref 0.05–0.2)
BILIRUB INDIRECT FLD-MCNC: 0.5 MG/DL — SIGNIFICANT CHANGE UP (ref 0.2–1)
BILIRUB SERPL-MCNC: 0.8 MG/DL — SIGNIFICANT CHANGE UP (ref 0.2–1.2)
BILIRUB SERPL-MCNC: 0.9 MG/DL — SIGNIFICANT CHANGE UP (ref 0.2–1.2)
BUN SERPL-MCNC: 14 MG/DL — SIGNIFICANT CHANGE UP (ref 7–23)
CALCIUM SERPL-MCNC: 7.6 MG/DL — LOW (ref 8.5–10.1)
CHLORIDE SERPL-SCNC: 92 MMOL/L — LOW (ref 96–108)
CK SERPL-CCNC: 109 U/L — SIGNIFICANT CHANGE UP (ref 26–308)
CO2 SERPL-SCNC: 29 MMOL/L — SIGNIFICANT CHANGE UP (ref 22–31)
CREAT SERPL-MCNC: 0.97 MG/DL — SIGNIFICANT CHANGE UP (ref 0.5–1.3)
EOSINOPHIL # BLD AUTO: 0.11 K/UL — SIGNIFICANT CHANGE UP (ref 0–0.5)
EOSINOPHIL NFR BLD AUTO: 3 % — SIGNIFICANT CHANGE UP (ref 0–6)
ETHANOL SERPL-MCNC: <10 MG/DL — SIGNIFICANT CHANGE UP (ref 0–10)
GLUCOSE BLDC GLUCOMTR-MCNC: 122 MG/DL — HIGH (ref 70–99)
GLUCOSE SERPL-MCNC: 124 MG/DL — HIGH (ref 70–99)
HCT VFR BLD CALC: 46.1 % — SIGNIFICANT CHANGE UP (ref 39–50)
HGB BLD-MCNC: 14.4 G/DL — SIGNIFICANT CHANGE UP (ref 13–17)
INR BLD: 1.03 RATIO — SIGNIFICANT CHANGE UP (ref 0.88–1.16)
LIDOCAIN IGE QN: 218 U/L — SIGNIFICANT CHANGE UP (ref 73–393)
LYMPHOCYTES # BLD AUTO: 0.51 K/UL — LOW (ref 1–3.3)
LYMPHOCYTES # BLD AUTO: 14 % — SIGNIFICANT CHANGE UP (ref 13–44)
MAGNESIUM SERPL-MCNC: 1.1 MG/DL — LOW (ref 1.6–2.6)
MAGNESIUM SERPL-MCNC: 1.1 MG/DL — LOW (ref 1.6–2.6)
MANUAL SMEAR VERIFICATION: SIGNIFICANT CHANGE UP
MCHC RBC-ENTMCNC: 26.1 PG — LOW (ref 27–34)
MCHC RBC-ENTMCNC: 31.2 GM/DL — LOW (ref 32–36)
MCV RBC AUTO: 83.5 FL — SIGNIFICANT CHANGE UP (ref 80–100)
MONOCYTES # BLD AUTO: 0.25 K/UL — SIGNIFICANT CHANGE UP (ref 0–0.9)
MONOCYTES NFR BLD AUTO: 7 % — SIGNIFICANT CHANGE UP (ref 2–14)
NEUTROPHILS # BLD AUTO: 2.5 K/UL — SIGNIFICANT CHANGE UP (ref 1.8–7.4)
NEUTROPHILS NFR BLD AUTO: 68 % — SIGNIFICANT CHANGE UP (ref 43–77)
NEUTS BAND # BLD: 1 — SIGNIFICANT CHANGE UP
NRBC # BLD: 0 — SIGNIFICANT CHANGE UP
NRBC # BLD: SIGNIFICANT CHANGE UP /100 WBCS (ref 0–0)
PHOSPHATE SERPL-MCNC: 2 MG/DL — LOW (ref 2.5–4.5)
PHOSPHATE SERPL-MCNC: 2.7 MG/DL — SIGNIFICANT CHANGE UP (ref 2.5–4.5)
PLAT MORPH BLD: NORMAL — SIGNIFICANT CHANGE UP
PLATELET # BLD AUTO: 73 K/UL — LOW (ref 150–400)
PLATELET COUNT - ESTIMATE: ABNORMAL
POTASSIUM SERPL-MCNC: 3.4 MMOL/L — LOW (ref 3.5–5.3)
POTASSIUM SERPL-SCNC: 3.4 MMOL/L — LOW (ref 3.5–5.3)
PROT SERPL-MCNC: 6.9 GM/DL — SIGNIFICANT CHANGE UP (ref 6–8.3)
PROT SERPL-MCNC: 7.9 GM/DL — SIGNIFICANT CHANGE UP (ref 6–8.3)
PROTHROM AB SERPL-ACNC: 11.2 SEC — SIGNIFICANT CHANGE UP (ref 9.8–12.7)
RBC # BLD: 5.52 M/UL — SIGNIFICANT CHANGE UP (ref 4.2–5.8)
RBC # FLD: 21.6 % — HIGH (ref 10.3–14.5)
RBC BLD AUTO: NORMAL — SIGNIFICANT CHANGE UP
SODIUM SERPL-SCNC: 134 MMOL/L — LOW (ref 135–145)
VARIANT LYMPHS # BLD: 7 % — HIGH (ref 0–6)
WBC # BLD: 3.63 K/UL — LOW (ref 3.8–10.5)
WBC # FLD AUTO: 3.63 K/UL — LOW (ref 3.8–10.5)

## 2018-03-03 PROCEDURE — 70450 CT HEAD/BRAIN W/O DYE: CPT | Mod: 26

## 2018-03-03 PROCEDURE — 99285 EMERGENCY DEPT VISIT HI MDM: CPT

## 2018-03-03 PROCEDURE — 93010 ELECTROCARDIOGRAM REPORT: CPT

## 2018-03-03 PROCEDURE — 71045 X-RAY EXAM CHEST 1 VIEW: CPT | Mod: 26

## 2018-03-03 PROCEDURE — 99223 1ST HOSP IP/OBS HIGH 75: CPT

## 2018-03-03 RX ORDER — HYDROXYCHLOROQUINE SULFATE 200 MG
200 TABLET ORAL
Qty: 0 | Refills: 0 | Status: DISCONTINUED | OUTPATIENT
Start: 2018-03-03 | End: 2018-03-06

## 2018-03-03 RX ORDER — CARVEDILOL PHOSPHATE 80 MG/1
25 CAPSULE, EXTENDED RELEASE ORAL EVERY 12 HOURS
Qty: 0 | Refills: 0 | Status: DISCONTINUED | OUTPATIENT
Start: 2018-03-03 | End: 2018-03-06

## 2018-03-03 RX ORDER — POTASSIUM CHLORIDE 20 MEQ
0 PACKET (EA) ORAL
Qty: 0 | Refills: 0 | COMMUNITY

## 2018-03-03 RX ORDER — THIAMINE MONONITRATE (VIT B1) 100 MG
100 TABLET ORAL DAILY
Qty: 0 | Refills: 0 | Status: COMPLETED | OUTPATIENT
Start: 2018-03-03 | End: 2018-03-05

## 2018-03-03 RX ORDER — MAGNESIUM SULFATE 500 MG/ML
2 VIAL (ML) INJECTION
Qty: 0 | Refills: 0 | Status: COMPLETED | OUTPATIENT
Start: 2018-03-03 | End: 2018-03-03

## 2018-03-03 RX ORDER — LISINOPRIL 2.5 MG/1
40 TABLET ORAL DAILY
Qty: 0 | Refills: 0 | Status: DISCONTINUED | OUTPATIENT
Start: 2018-03-03 | End: 2018-03-06

## 2018-03-03 RX ORDER — POTASSIUM CHLORIDE 20 MEQ
40 PACKET (EA) ORAL EVERY 4 HOURS
Qty: 0 | Refills: 0 | Status: COMPLETED | OUTPATIENT
Start: 2018-03-03 | End: 2018-03-04

## 2018-03-03 RX ORDER — SODIUM CHLORIDE 9 MG/ML
1000 INJECTION, SOLUTION INTRAVENOUS
Qty: 0 | Refills: 0 | Status: COMPLETED | OUTPATIENT
Start: 2018-03-03 | End: 2018-03-03

## 2018-03-03 RX ORDER — SIMVASTATIN 20 MG/1
20 TABLET, FILM COATED ORAL AT BEDTIME
Qty: 0 | Refills: 0 | Status: DISCONTINUED | OUTPATIENT
Start: 2018-03-03 | End: 2018-03-06

## 2018-03-03 RX ORDER — FOLIC ACID 0.8 MG
1 TABLET ORAL DAILY
Qty: 0 | Refills: 0 | Status: DISCONTINUED | OUTPATIENT
Start: 2018-03-03 | End: 2018-03-06

## 2018-03-03 RX ORDER — NICOTINE POLACRILEX 2 MG
1 GUM BUCCAL DAILY
Qty: 0 | Refills: 0 | Status: DISCONTINUED | OUTPATIENT
Start: 2018-03-03 | End: 2018-03-06

## 2018-03-03 RX ORDER — FERROUS SULFATE 325(65) MG
1 TABLET ORAL
Qty: 0 | Refills: 0 | COMMUNITY

## 2018-03-03 RX ORDER — PANTOPRAZOLE SODIUM 20 MG/1
40 TABLET, DELAYED RELEASE ORAL
Qty: 0 | Refills: 0 | Status: DISCONTINUED | OUTPATIENT
Start: 2018-03-03 | End: 2018-03-06

## 2018-03-03 RX ORDER — IPRATROPIUM BROMIDE 0.2 MG/ML
500 SOLUTION, NON-ORAL INHALATION EVERY 6 HOURS
Qty: 0 | Refills: 0 | Status: DISCONTINUED | OUTPATIENT
Start: 2018-03-03 | End: 2018-03-06

## 2018-03-03 RX ADMIN — SODIUM CHLORIDE 200 MILLILITER(S): 9 INJECTION, SOLUTION INTRAVENOUS at 10:24

## 2018-03-03 RX ADMIN — Medication 40 MILLIEQUIVALENT(S): at 22:22

## 2018-03-03 RX ADMIN — Medication 40 MILLIEQUIVALENT(S): at 18:12

## 2018-03-03 RX ADMIN — Medication 100 MILLIGRAM(S): at 12:39

## 2018-03-03 RX ADMIN — Medication 200 MILLIGRAM(S): at 18:13

## 2018-03-03 RX ADMIN — Medication 40 MILLIGRAM(S): at 16:32

## 2018-03-03 RX ADMIN — CARVEDILOL PHOSPHATE 25 MILLIGRAM(S): 80 CAPSULE, EXTENDED RELEASE ORAL at 18:14

## 2018-03-03 RX ADMIN — Medication 500 MICROGRAM(S): at 17:58

## 2018-03-03 RX ADMIN — Medication 1 MILLIGRAM(S): at 12:39

## 2018-03-03 RX ADMIN — SIMVASTATIN 20 MILLIGRAM(S): 20 TABLET, FILM COATED ORAL at 22:22

## 2018-03-03 RX ADMIN — Medication 50 GRAM(S): at 12:39

## 2018-03-03 RX ADMIN — Medication 50 GRAM(S): at 18:13

## 2018-03-03 RX ADMIN — Medication 2 MILLIGRAM(S): at 09:57

## 2018-03-03 RX ADMIN — Medication 1 PATCH: at 12:39

## 2018-03-03 RX ADMIN — Medication 2 MILLIGRAM(S): at 18:12

## 2018-03-03 RX ADMIN — Medication 2 MILLIGRAM(S): at 22:30

## 2018-03-03 RX ADMIN — Medication 1 TABLET(S): at 12:39

## 2018-03-03 RX ADMIN — Medication 2 MILLIGRAM(S): at 13:44

## 2018-03-03 RX ADMIN — Medication 500 MICROGRAM(S): at 13:12

## 2018-03-03 NOTE — H&P ADULT - ASSESSMENT
36yo male with pmh COPD, chronic etoh, lupus, h/o afib previously on coumadin (taken off as per pt by cardio). Presenting with etoh witdrawl seizure last night. Drinks 3pints of vodka daily, decided to stop last night. Has had seizures in the past. Currently feels better. States he has also been noticing some sob, cough and wheezing. Denies headaches, chest pain, palpitation dizziness

## 2018-03-03 NOTE — ED ADULT TRIAGE NOTE - CHIEF COMPLAINT QUOTE
patient here for seizure x1 , patient stated he is drinking and the last drink was yesterday , patient has a tremors , denied abdominal pain no N/V , denied headache denied hitting head

## 2018-03-03 NOTE — ED PROVIDER NOTE - PSH
Cataract (lens) fragments in eye following cataract surgery, left eye    History of throat surgery  cyst removed  History of throat surgery  stretched

## 2018-03-03 NOTE — ED PROVIDER NOTE - OBJECTIVE STATEMENT
37 year old male with PMH of Lupus,COPD, leukopenia hx presenting to ED due to seizure activity after stopping drinking - last drink was yesterday -pt states was trying to get sober on his own. Girlfriend states pt was shaking then had seizure - currently in ED noted awake and speaking in full sentences but having some shakiness noted.

## 2018-03-03 NOTE — ED ADULT NURSE NOTE - OBJECTIVE STATEMENT
pt witnessed seizure by girlfriend, pt etoh abuse, last drink friday. pt received awake, alert, orient x 2. able to answer questions, mild tremors noted. pt states has DNR signed at home.

## 2018-03-03 NOTE — ED PROVIDER NOTE - CONSTITUTIONAL, MLM
normal... Well appearing, well nourished, awake, alert, oriented to person, place, time/situation and appears to have some fine tremors on movement

## 2018-03-03 NOTE — ED ADULT NURSE REASSESSMENT NOTE - NS ED NURSE REASSESS COMMENT FT1
attempted to give report, as per KIM Plascencia is not ready for report. Will endorse to primary RN.

## 2018-03-03 NOTE — H&P ADULT - PROBLEM SELECTOR PLAN 1
CIWA protocol  Seizure precautions   correct hypokalemia/hypomagnesemia  SCDs due to thrombocytopenia  Thiamine/Folate/Vitamin deficiency, supplemented

## 2018-03-03 NOTE — ED PROVIDER NOTE - MEDICAL DECISION MAKING DETAILS
pt with alcohol withdrawal to admit for further care of symptoms and alcohol withdrawal seizure given ativan in ED for acute withdrawal symptoms.

## 2018-03-03 NOTE — H&P ADULT - NSHPPHYSICALEXAM_GEN_ALL_CORE
GENERAL: NAD, well-groomed, well-developed  HEAD:  Atraumatic, Normocephalic  EYES: EOMI, PERRLA, conjunctiva and sclera clear  ENMT: No tonsillar erythema, exudates, or enlargement; Moist mucous membranes, Good dentition, No lesions  NECK: Supple, No JVD, Normal thyroid  NERVOUS SYSTEM:  Alert & Oriented X3, Good concentration; Motor Strength 5/5 B/L upper and lower extremities; DTRs 2+ intact and symmetric . TREMULOUS  CHEST/LUNG: Clear to percussion bilaterally; b/L exp wheezing   HEART: Regular rate and rhythm, tachycardic; No murmurs, rubs, or gallops  ABDOMEN: Soft, Nontender, Nondistended; Bowel sounds present  EXTREMITIES:  2+ Peripheral Pulses, No clubbing, cyanosis, or edema  LYMPH: No lymphadenopathy   SKIN: No rashes or lesions

## 2018-03-03 NOTE — H&P ADULT - NSHPREVIEWOFSYSTEMS_GEN_ALL_CORE
CONSTITUTIONAL: No fever, weight loss, ++ fatigue/insomnia  EYES: No eye pain, visual disturbances, or discharge  ENMT:  No difficulty hearing, tinnitus, vertigo; No sinus or throat pain  NECK: No pain or stiffness  BREASTS: No pain, masses, or nipple discharge  RESPIRATORY: ++wheezing, cough  CARDIOVASCULAR: No chest pain, palpitations, dizziness, or leg swelling  GASTROINTESTINAL: No abdominal or epigastric pain. No nausea, vomiting, or hematemesis; No diarrhea or constipation. No melena or hematochezia.  GENITOURINARY: No dysuria, frequency, hematuria, or incontinence  NEUROLOGICAL: ++seizure  SKIN: No itching, burning, rashes, or lesions   LYMPH NODES: No enlarged glands  ENDOCRINE: No heat or cold intolerance; No hair loss  MUSCULOSKELETAL: No joint pain or swelling; No muscle, back, or extremity pain  PSYCHIATRIC: No depression, anxiety, mood swings, or difficulty sleeping  HEME/LYMPH: No easy bruising, or bleeding gums  ALLERGY AND IMMUNOLOGIC: No hives or eczema

## 2018-03-03 NOTE — H&P ADULT - HISTORY OF PRESENT ILLNESS
38yo male with pmh COPD, chronic etoh, lupus, h/o afib previously on coumadin (taken off as per pt by cardio). Presenting with etoh witdrawl seizure last night. Drinks 3pints of vodka daily, decided to stop last night. Has had seizures in the past. Currently feels better. States he has also been noticing some sob, cough and wheezing. Denies headaches, chest pain, palpitation dizziness

## 2018-03-03 NOTE — H&P ADULT - NSHPLABSRESULTS_GEN_ALL_CORE
14.4   3.63  )-----------( 73       ( 03 Mar 2018 10:01 )             46.1     03-03    134<L>  |  92<L>  |  14  ----------------------------<  124<H>  3.4<L>   |  29  |  0.97    Ca    7.6<L>      03 Mar 2018 10:01  Phos  2.7     03-03  Mg     1.1     03-03    TPro  7.9  /  Alb  2.5<L>  /  TBili  0.9  /  DBili  x   /  AST  180<H>  /  ALT  138<H>  /  AlkPhos  245<H>  03-03    PT/INR - ( 03 Mar 2018 10:01 )   PT: 11.2 sec;   INR: 1.03 ratio         PTT - ( 03 Mar 2018 10:01 )  PTT:40.2 sec    < from: CT Head No Cont (03.03.18 @ 10:18) >    IMPRESSION:    No acute intracranial hemorrhage.                 BEATA LIU M.D., ATTENDING RADIOLOGIST  This document has been electronically signed. Mar  3 2018 10:26AM                < end of copied text >

## 2018-03-04 LAB
ANION GAP SERPL CALC-SCNC: 8 MMOL/L — SIGNIFICANT CHANGE UP (ref 5–17)
ANION GAP SERPL CALC-SCNC: 9 MMOL/L — SIGNIFICANT CHANGE UP (ref 5–17)
BUN SERPL-MCNC: 16 MG/DL — SIGNIFICANT CHANGE UP (ref 7–23)
BUN SERPL-MCNC: 16 MG/DL — SIGNIFICANT CHANGE UP (ref 7–23)
CALCIUM SERPL-MCNC: 8.1 MG/DL — LOW (ref 8.5–10.1)
CALCIUM SERPL-MCNC: 8.1 MG/DL — LOW (ref 8.5–10.1)
CHLORIDE SERPL-SCNC: 98 MMOL/L — SIGNIFICANT CHANGE UP (ref 96–108)
CHLORIDE SERPL-SCNC: 99 MMOL/L — SIGNIFICANT CHANGE UP (ref 96–108)
CK SERPL-CCNC: 97 U/L — SIGNIFICANT CHANGE UP (ref 26–308)
CO2 SERPL-SCNC: 30 MMOL/L — SIGNIFICANT CHANGE UP (ref 22–31)
CO2 SERPL-SCNC: 30 MMOL/L — SIGNIFICANT CHANGE UP (ref 22–31)
CREAT SERPL-MCNC: 0.85 MG/DL — SIGNIFICANT CHANGE UP (ref 0.5–1.3)
CREAT SERPL-MCNC: 0.89 MG/DL — SIGNIFICANT CHANGE UP (ref 0.5–1.3)
GLUCOSE SERPL-MCNC: 107 MG/DL — HIGH (ref 70–99)
GLUCOSE SERPL-MCNC: 109 MG/DL — HIGH (ref 70–99)
HCT VFR BLD CALC: 43 % — SIGNIFICANT CHANGE UP (ref 39–50)
HGB BLD-MCNC: 13.2 G/DL — SIGNIFICANT CHANGE UP (ref 13–17)
MAGNESIUM SERPL-MCNC: 1.9 MG/DL — SIGNIFICANT CHANGE UP (ref 1.6–2.6)
MCHC RBC-ENTMCNC: 26.1 PG — LOW (ref 27–34)
MCHC RBC-ENTMCNC: 30.7 GM/DL — LOW (ref 32–36)
MCV RBC AUTO: 85 FL — SIGNIFICANT CHANGE UP (ref 80–100)
NRBC # BLD: 0 /100 WBCS — SIGNIFICANT CHANGE UP (ref 0–0)
PHOSPHATE SERPL-MCNC: 2.1 MG/DL — LOW (ref 2.5–4.5)
PLATELET # BLD AUTO: 72 K/UL — LOW (ref 150–400)
POTASSIUM SERPL-MCNC: 4 MMOL/L — SIGNIFICANT CHANGE UP (ref 3.5–5.3)
POTASSIUM SERPL-MCNC: 4.1 MMOL/L — SIGNIFICANT CHANGE UP (ref 3.5–5.3)
POTASSIUM SERPL-SCNC: 4 MMOL/L — SIGNIFICANT CHANGE UP (ref 3.5–5.3)
POTASSIUM SERPL-SCNC: 4.1 MMOL/L — SIGNIFICANT CHANGE UP (ref 3.5–5.3)
RBC # BLD: 5.06 M/UL — SIGNIFICANT CHANGE UP (ref 4.2–5.8)
RBC # FLD: 21.9 % — HIGH (ref 10.3–14.5)
SODIUM SERPL-SCNC: 136 MMOL/L — SIGNIFICANT CHANGE UP (ref 135–145)
SODIUM SERPL-SCNC: 138 MMOL/L — SIGNIFICANT CHANGE UP (ref 135–145)
WBC # BLD: 2.94 K/UL — LOW (ref 3.8–10.5)
WBC # FLD AUTO: 2.94 K/UL — LOW (ref 3.8–10.5)

## 2018-03-04 PROCEDURE — 99233 SBSQ HOSP IP/OBS HIGH 50: CPT

## 2018-03-04 RX ORDER — SODIUM,POTASSIUM PHOSPHATES 278-250MG
1 POWDER IN PACKET (EA) ORAL
Qty: 0 | Refills: 0 | Status: COMPLETED | OUTPATIENT
Start: 2018-03-04 | End: 2018-03-05

## 2018-03-04 RX ADMIN — Medication 1 PATCH: at 12:29

## 2018-03-04 RX ADMIN — Medication 200 MILLIGRAM(S): at 17:18

## 2018-03-04 RX ADMIN — Medication 200 MILLIGRAM(S): at 08:54

## 2018-03-04 RX ADMIN — Medication 500 MICROGRAM(S): at 17:00

## 2018-03-04 RX ADMIN — Medication 1.5 MILLIGRAM(S): at 17:21

## 2018-03-04 RX ADMIN — Medication 500 MICROGRAM(S): at 05:09

## 2018-03-04 RX ADMIN — Medication 1 PATCH: at 12:42

## 2018-03-04 RX ADMIN — Medication 1.5 MILLIGRAM(S): at 22:24

## 2018-03-04 RX ADMIN — Medication 1 TABLET(S): at 17:18

## 2018-03-04 RX ADMIN — LISINOPRIL 40 MILLIGRAM(S): 2.5 TABLET ORAL at 06:09

## 2018-03-04 RX ADMIN — Medication 500 MICROGRAM(S): at 00:21

## 2018-03-04 RX ADMIN — CARVEDILOL PHOSPHATE 25 MILLIGRAM(S): 80 CAPSULE, EXTENDED RELEASE ORAL at 17:18

## 2018-03-04 RX ADMIN — Medication 40 MILLIEQUIVALENT(S): at 03:44

## 2018-03-04 RX ADMIN — Medication 1.5 MILLIGRAM(S): at 15:21

## 2018-03-04 RX ADMIN — SIMVASTATIN 20 MILLIGRAM(S): 20 TABLET, FILM COATED ORAL at 22:02

## 2018-03-04 RX ADMIN — Medication 1 TABLET(S): at 11:08

## 2018-03-04 RX ADMIN — Medication 2 MILLIGRAM(S): at 03:00

## 2018-03-04 RX ADMIN — PANTOPRAZOLE SODIUM 40 MILLIGRAM(S): 20 TABLET, DELAYED RELEASE ORAL at 06:09

## 2018-03-04 RX ADMIN — Medication 2 MILLIGRAM(S): at 06:08

## 2018-03-04 RX ADMIN — Medication 1.5 MILLIGRAM(S): at 10:54

## 2018-03-04 RX ADMIN — Medication 100 MILLIGRAM(S): at 11:08

## 2018-03-04 RX ADMIN — Medication 40 MILLIGRAM(S): at 06:08

## 2018-03-04 RX ADMIN — Medication 1 MILLIGRAM(S): at 11:08

## 2018-03-04 RX ADMIN — Medication 500 MICROGRAM(S): at 11:03

## 2018-03-04 RX ADMIN — CARVEDILOL PHOSPHATE 25 MILLIGRAM(S): 80 CAPSULE, EXTENDED RELEASE ORAL at 06:09

## 2018-03-04 NOTE — PROGRESS NOTE ADULT - SUBJECTIVE AND OBJECTIVE BOX
Patient is a 37y old  Male who presents with a chief complaint of seizure (03 Mar 2018 12:04)      INTERVAL HPI/OVERNIGHT EVENTS: no events overnight     MEDICATIONS  (STANDING):  carvedilol 25 milliGRAM(s) Oral every 12 hours  folic acid 1 milliGRAM(s) Oral daily  hydroxychloroquine 200 milliGRAM(s) Oral two times a day with meals  ipratropium    for Nebulization 500 MICROGram(s) Nebulizer every 6 hours  lisinopril 40 milliGRAM(s) Oral daily  LORazepam   Injectable   IV Push   LORazepam   Injectable 1.5 milliGRAM(s) IV Push every 4 hours  methylPREDNISolone sodium succinate Injectable 40 milliGRAM(s) IV Push daily  multivitamin 1 Tablet(s) Oral daily  nicotine - 21 mG/24Hr(s) Patch 1 Patch Transdermal daily  pantoprazole    Tablet 40 milliGRAM(s) Oral before breakfast  potassium acid phosphate/sodium acid phosphate tablet (K-PHOS No. 2) 1 Tablet(s) Oral two times a day before meals  simvastatin 20 milliGRAM(s) Oral at bedtime  thiamine 100 milliGRAM(s) Oral daily    MEDICATIONS  (PRN):  LORazepam   Injectable 2 milliGRAM(s) IV Push every 1 hour PRN Symptom-triggered: each CIWA -Ar score 8 or GREATER      Allergies    sulfa drugs (Unknown)    Intolerances          Vital Signs Last 24 Hrs  T(C): 36.9 (04 Mar 2018 11:25), Max: 38.1 (03 Mar 2018 14:57)  T(F): 98.4 (04 Mar 2018 11:25), Max: 100.6 (03 Mar 2018 14:57)  HR: 108 (04 Mar 2018 11:25) (87 - 122)  BP: 132/92 (04 Mar 2018 11:25) (122/90 - 143/97)  BP(mean): --  RR: 17 (04 Mar 2018 11:25) (16 - 19)  SpO2: 92% (04 Mar 2018 11:25) (89% - 97%)    PHYSICAL EXAM:  GENERAL: NAD, well-groomed, well-developed  HEAD:  Atraumatic, Normocephalic  EYES: EOMI, PERRLA, conjunctiva and sclera clear  ENMT: No tonsillar erythema, exudates, or enlargement; Moist mucous membranes, Good dentition, No lesions  NECK: Supple, No JVD, Normal thyroid  NERVOUS SYSTEM:  Alert & Oriented X3, Good concentration; Motor Strength 5/5 B/L upper and lower extremities; DTRs 2+ intact and symmetric  CHEST/LUNG: Clear to percussion bilaterally; No rales, rhonchi, wheezing, or rubs  HEART: Regular rate and rhythm; No murmurs, rubs, or gallops  ABDOMEN: Soft, Nontender, Nondistended; Bowel sounds present  EXTREMITIES:  2+ Peripheral Pulses, No clubbing, cyanosis, or edema  LYMPH: No lymphadenopathy noted  SKIN: No rashes or lesions    LABS:                        13.2   2.94  )-----------( 72       ( 04 Mar 2018 09:02 )             43.0     03-04    136  |  98  |  16  ----------------------------<  107<H>  4.1   |  30  |  0.85    Ca    8.1<L>      04 Mar 2018 09:02  Phos  2.1     03-04  Mg     1.9     03-04    TPro  6.9  /  Alb  2.2<L>  /  TBili  0.8  /  DBili  .29<H>  /  AST  129<H>  /  ALT  113<H>  /  AlkPhos  201<H>  03-03    PT/INR - ( 03 Mar 2018 10:01 )   PT: 11.2 sec;   INR: 1.03 ratio         PTT - ( 03 Mar 2018 10:01 )  PTT:40.2 sec    CAPILLARY BLOOD GLUCOSE          RADIOLOGY & ADDITIONAL TESTS:    Imaging Personally Reviewed:  [ ] YES  [ ] NO    Consultant(s) Notes Reviewed:  [ ] YES  [ ] NO    Care Discussed with Consultants/Other Providers [ ] YES  [ ] NO

## 2018-03-04 NOTE — PROGRESS NOTE ADULT - PROBLEM SELECTOR PLAN 2
improving switch to prednisone   ipratropium, can add albuterol when pt less tachy from withdrawl  monitor o2 sats

## 2018-03-05 LAB
ANION GAP SERPL CALC-SCNC: 12 MMOL/L — SIGNIFICANT CHANGE UP (ref 5–17)
BUN SERPL-MCNC: 26 MG/DL — HIGH (ref 7–23)
CALCIUM SERPL-MCNC: 8.2 MG/DL — LOW (ref 8.5–10.1)
CHLORIDE SERPL-SCNC: 102 MMOL/L — SIGNIFICANT CHANGE UP (ref 96–108)
CO2 SERPL-SCNC: 26 MMOL/L — SIGNIFICANT CHANGE UP (ref 22–31)
CREAT SERPL-MCNC: 1.07 MG/DL — SIGNIFICANT CHANGE UP (ref 0.5–1.3)
GLUCOSE SERPL-MCNC: 115 MG/DL — HIGH (ref 70–99)
MAGNESIUM SERPL-MCNC: 1.4 MG/DL — LOW (ref 1.6–2.6)
PHOSPHATE SERPL-MCNC: 3.3 MG/DL — SIGNIFICANT CHANGE UP (ref 2.5–4.5)
POTASSIUM SERPL-MCNC: 3.6 MMOL/L — SIGNIFICANT CHANGE UP (ref 3.5–5.3)
POTASSIUM SERPL-SCNC: 3.6 MMOL/L — SIGNIFICANT CHANGE UP (ref 3.5–5.3)
SODIUM SERPL-SCNC: 140 MMOL/L — SIGNIFICANT CHANGE UP (ref 135–145)

## 2018-03-05 PROCEDURE — 99233 SBSQ HOSP IP/OBS HIGH 50: CPT

## 2018-03-05 RX ORDER — MAGNESIUM SULFATE 500 MG/ML
2 VIAL (ML) INJECTION ONCE
Qty: 0 | Refills: 0 | Status: COMPLETED | OUTPATIENT
Start: 2018-03-05 | End: 2018-03-05

## 2018-03-05 RX ORDER — ACETAMINOPHEN 500 MG
650 TABLET ORAL ONCE
Qty: 0 | Refills: 0 | Status: COMPLETED | OUTPATIENT
Start: 2018-03-05 | End: 2018-03-05

## 2018-03-05 RX ADMIN — Medication 1 PATCH: at 11:19

## 2018-03-05 RX ADMIN — Medication 1 TABLET(S): at 12:22

## 2018-03-05 RX ADMIN — Medication 500 MICROGRAM(S): at 00:31

## 2018-03-05 RX ADMIN — Medication 1.5 MILLIGRAM(S): at 01:59

## 2018-03-05 RX ADMIN — Medication 40 MILLIGRAM(S): at 05:33

## 2018-03-05 RX ADMIN — Medication 650 MILLIGRAM(S): at 20:53

## 2018-03-05 RX ADMIN — LISINOPRIL 40 MILLIGRAM(S): 2.5 TABLET ORAL at 05:32

## 2018-03-05 RX ADMIN — Medication 1 MILLIGRAM(S): at 10:26

## 2018-03-05 RX ADMIN — Medication 50 GRAM(S): at 17:06

## 2018-03-05 RX ADMIN — Medication 650 MILLIGRAM(S): at 21:46

## 2018-03-05 RX ADMIN — CARVEDILOL PHOSPHATE 25 MILLIGRAM(S): 80 CAPSULE, EXTENDED RELEASE ORAL at 17:06

## 2018-03-05 RX ADMIN — Medication 1 TABLET(S): at 05:32

## 2018-03-05 RX ADMIN — Medication 500 MICROGRAM(S): at 17:08

## 2018-03-05 RX ADMIN — Medication 200 MILLIGRAM(S): at 17:07

## 2018-03-05 RX ADMIN — Medication 1 MILLIGRAM(S): at 22:25

## 2018-03-05 RX ADMIN — Medication 1 MILLIGRAM(S): at 18:59

## 2018-03-05 RX ADMIN — PANTOPRAZOLE SODIUM 40 MILLIGRAM(S): 20 TABLET, DELAYED RELEASE ORAL at 05:33

## 2018-03-05 RX ADMIN — Medication 1 MILLIGRAM(S): at 12:22

## 2018-03-05 RX ADMIN — Medication 1.5 MILLIGRAM(S): at 05:34

## 2018-03-05 RX ADMIN — Medication 1 MILLIGRAM(S): at 14:08

## 2018-03-05 RX ADMIN — SIMVASTATIN 20 MILLIGRAM(S): 20 TABLET, FILM COATED ORAL at 22:27

## 2018-03-05 RX ADMIN — Medication 500 MICROGRAM(S): at 06:44

## 2018-03-05 RX ADMIN — Medication 500 MICROGRAM(S): at 11:40

## 2018-03-05 RX ADMIN — CARVEDILOL PHOSPHATE 25 MILLIGRAM(S): 80 CAPSULE, EXTENDED RELEASE ORAL at 05:33

## 2018-03-05 RX ADMIN — Medication 100 MILLIGRAM(S): at 14:56

## 2018-03-05 RX ADMIN — Medication 200 MILLIGRAM(S): at 08:40

## 2018-03-05 RX ADMIN — Medication 1 PATCH: at 11:18

## 2018-03-05 NOTE — PROGRESS NOTE ADULT - SUBJECTIVE AND OBJECTIVE BOX
Patient is a 37y old  Male who presents with a chief complaint of seizure (03 Mar 2018 12:04)      INTERVAL HPI/OVERNIGHT EVENTS: no acute events continues to be tremulous     MEDICATIONS  (STANDING):  carvedilol 25 milliGRAM(s) Oral every 12 hours  folic acid 1 milliGRAM(s) Oral daily  hydroxychloroquine 200 milliGRAM(s) Oral two times a day with meals  ipratropium    for Nebulization 500 MICROGram(s) Nebulizer every 6 hours  lisinopril 40 milliGRAM(s) Oral daily  LORazepam   Injectable   IV Push   LORazepam   Injectable 1 milliGRAM(s) IV Push every 4 hours  magnesium sulfate  IVPB 2 Gram(s) IV Intermittent once  multivitamin 1 Tablet(s) Oral daily  nicotine - 21 mG/24Hr(s) Patch 1 Patch Transdermal daily  pantoprazole    Tablet 40 milliGRAM(s) Oral before breakfast  predniSONE   Tablet 40 milliGRAM(s) Oral daily  simvastatin 20 milliGRAM(s) Oral at bedtime  thiamine 100 milliGRAM(s) Oral daily    MEDICATIONS  (PRN):  LORazepam   Injectable 2 milliGRAM(s) IV Push every 1 hour PRN Symptom-triggered: each CIWA -Ar score 8 or GREATER      Allergies    sulfa drugs (Unknown)    Intolerances        REVIEW OF SYSTEMS:  CONSTITUTIONAL: No fever, weight loss, or fatigue  EYES: No eye pain, visual disturbances, or discharge  ENMT:  tongue fasciculations   NECK: No pain or stiffness  RESPIRATORY: No cough, wheezing, chills or hemoptysis; No shortness of breath  CARDIOVASCULAR: No chest pain, palpitations, dizziness, or leg swelling  GASTROINTESTINAL: No abdominal or epigastric pain. No nausea, vomiting, or hematemesis; No diarrhea or constipation. No melena or hematochezia.  GENITOURINARY: No dysuria, frequency, hematuria, or incontinence  NEUROLOGICAL:  tremors  SKIN: No itching, burning, rashes, or lesions   LYMPH NODES: No enlarged glands  ENDOCRINE: No heat or cold intolerance; No hair loss  MUSCULOSKELETAL: No joint pain or swelling; No muscle, back, or extremity pain  PSYCHIATRIC: anxiety   HEME/LYMPH: No easy bruising, or bleeding gums  ALLERGY AND IMMUNOLOGIC: No hives or eczema    Vital Signs Last 24 Hrs  T(C): 35.9 (05 Mar 2018 06:26), Max: 36.9 (04 Mar 2018 11:25)  T(F): 96.6 (05 Mar 2018 06:26), Max: 98.4 (04 Mar 2018 11:25)  HR: 107 (05 Mar 2018 06:45) (87 - 113)  BP: 134/98 (05 Mar 2018 05:28) (118/88 - 136/99)  BP(mean): 110 (05 Mar 2018 05:28) (110 - 110)  RR: 17 (05 Mar 2018 00:32) (17 - 18)  SpO2: 98% (05 Mar 2018 06:45) (92% - 98%)    PHYSICAL EXAM:  GENERAL: NAD poorly groomed   HEAD:  Atraumatic, Normocephalic  EYES: EOMI, PERRLA, conjunctiva and sclera clear  ENMT: tongue fasiculations   NECK: Supple, No JVD, Normal thyroid  NERVOUS SYSTEM:  Alert & Oriented X3, poor  concentration tremors   CHEST/LUNG: Clear to percussion bilaterally; No rales, rhonchi, wheezing, or rubs  HEART: Regular rate and rhythm; No murmurs, rubs, or gallops  ABDOMEN: Soft, Nontender, Nondistended; Bowel sounds present abdominal obesity   EXTREMITIES:  2+ Peripheral Pulses, No clubbing, cyanosis, or edema  LYMPH: No lymphadenopathy noted  SKIN: No rashes or lesions    LABS:                        13.2   2.94  )-----------( 72       ( 04 Mar 2018 09:02 )             43.0     03-05    140  |  102  |  26<H>  ----------------------------<  115<H>  3.6   |  26  |  1.07    Ca    8.2<L>      05 Mar 2018 08:23  Phos  3.3     03-05  Mg     1.4     03-05    TPro  6.9  /  Alb  2.2<L>  /  TBili  0.8  /  DBili  .29<H>  /  AST  129<H>  /  ALT  113<H>  /  AlkPhos  201<H>  03-03        CAPILLARY BLOOD GLUCOSE          RADIOLOGY & ADDITIONAL TESTS:    Imaging Personally Reviewed:  [X] YES  [ ] NO    Consultant(s) Notes Reviewed:  [X ] YES  [ ] NO    Care Discussed with Consultants/Other Providers [ X] YES  [ ] NO

## 2018-03-05 NOTE — DIETITIAN INITIAL EVALUATION ADULT. - OTHER INFO
Pt seen for consult. Pt lives c mom & girlfriend; he does the shopping & cooking; independent c ADL. Pt reports wt stable; denies any N/V/D; constipation at times. Advised pt to request stool softener if necessary. Discussed heart healthy diet recommendations.

## 2018-03-05 NOTE — PROGRESS NOTE ADULT - ASSESSMENT
36yo male with pmh COPD, chronic etoh, lupus, h/o afib previously on coumadin (taken off as per pt by cardio). Presenting with etoh witdrawl seizure last night. Drinks 3pints of vodka daily, decided to stop last night. Has had seizures in the past. Currently feels better. States he has also been noticing some sob, cough and wheezing. Denies headaches, chest pain, palpitation dizziness     Still wit CIWA of 4 continue to observe

## 2018-03-05 NOTE — DIETITIAN INITIAL EVALUATION ADULT. - ENERGY NEEDS
Height (cm): 177.8 (03-03)  Weight (kg): 111.2 (03-03)  BMI (kg/m2): 35.2 (03-03)  IBW:  75.5 kg +/- 10%     % IBW: 147%      UBW:   stable    %UBW: 100%

## 2018-03-06 ENCOUNTER — TRANSCRIPTION ENCOUNTER (OUTPATIENT)
Age: 38
End: 2018-03-06

## 2018-03-06 VITALS — WEIGHT: 234.79 LBS

## 2018-03-06 LAB
ANION GAP SERPL CALC-SCNC: 7 MMOL/L — SIGNIFICANT CHANGE UP (ref 5–17)
BUN SERPL-MCNC: 25 MG/DL — HIGH (ref 7–23)
CALCIUM SERPL-MCNC: 7.9 MG/DL — LOW (ref 8.5–10.1)
CHLORIDE SERPL-SCNC: 104 MMOL/L — SIGNIFICANT CHANGE UP (ref 96–108)
CO2 SERPL-SCNC: 29 MMOL/L — SIGNIFICANT CHANGE UP (ref 22–31)
CREAT SERPL-MCNC: 0.67 MG/DL — SIGNIFICANT CHANGE UP (ref 0.5–1.3)
GLUCOSE SERPL-MCNC: 91 MG/DL — SIGNIFICANT CHANGE UP (ref 70–99)
HCT VFR BLD CALC: 38.7 % — LOW (ref 39–50)
HGB BLD-MCNC: 11.9 G/DL — LOW (ref 13–17)
MAGNESIUM SERPL-MCNC: 1.3 MG/DL — LOW (ref 1.6–2.6)
MCHC RBC-ENTMCNC: 26.8 PG — LOW (ref 27–34)
MCHC RBC-ENTMCNC: 30.7 GM/DL — LOW (ref 32–36)
MCV RBC AUTO: 87.2 FL — SIGNIFICANT CHANGE UP (ref 80–100)
NRBC # BLD: 0 /100 WBCS — SIGNIFICANT CHANGE UP (ref 0–0)
PHOSPHATE SERPL-MCNC: 4.9 MG/DL — HIGH (ref 2.5–4.5)
PLATELET # BLD AUTO: 92 K/UL — LOW (ref 150–400)
POTASSIUM SERPL-MCNC: 3.4 MMOL/L — LOW (ref 3.5–5.3)
POTASSIUM SERPL-SCNC: 3.4 MMOL/L — LOW (ref 3.5–5.3)
RBC # BLD: 4.44 M/UL — SIGNIFICANT CHANGE UP (ref 4.2–5.8)
RBC # FLD: 21.7 % — HIGH (ref 10.3–14.5)
SODIUM SERPL-SCNC: 140 MMOL/L — SIGNIFICANT CHANGE UP (ref 135–145)
WBC # BLD: 3.33 K/UL — LOW (ref 3.8–10.5)
WBC # FLD AUTO: 3.33 K/UL — LOW (ref 3.8–10.5)

## 2018-03-06 PROCEDURE — 99239 HOSP IP/OBS DSCHRG MGMT >30: CPT

## 2018-03-06 RX ORDER — FOLIC ACID 0.8 MG
1 TABLET ORAL
Qty: 30 | Refills: 0 | OUTPATIENT
Start: 2018-03-06 | End: 2018-04-04

## 2018-03-06 RX ORDER — TIOTROPIUM BROMIDE 18 UG/1
1 CAPSULE ORAL; RESPIRATORY (INHALATION) DAILY
Qty: 0 | Refills: 0 | Status: COMPLETED | OUTPATIENT
Start: 2018-03-06 | End: 2019-02-02

## 2018-03-06 RX ORDER — TIOTROPIUM BROMIDE 18 UG/1
1 CAPSULE ORAL; RESPIRATORY (INHALATION) DAILY
Qty: 0 | Refills: 0 | Status: DISCONTINUED | OUTPATIENT
Start: 2018-03-06 | End: 2018-03-06

## 2018-03-06 RX ORDER — MAGNESIUM SULFATE 500 MG/ML
2 VIAL (ML) INJECTION ONCE
Qty: 0 | Refills: 0 | Status: COMPLETED | OUTPATIENT
Start: 2018-03-06 | End: 2018-03-06

## 2018-03-06 RX ORDER — ALBUTEROL 90 UG/1
1 AEROSOL, METERED ORAL EVERY 4 HOURS
Qty: 0 | Refills: 0 | Status: COMPLETED | OUTPATIENT
Start: 2018-03-06 | End: 2019-02-02

## 2018-03-06 RX ORDER — POTASSIUM CHLORIDE 20 MEQ
40 PACKET (EA) ORAL EVERY 4 HOURS
Qty: 0 | Refills: 0 | Status: DISCONTINUED | OUTPATIENT
Start: 2018-03-06 | End: 2018-03-06

## 2018-03-06 RX ORDER — ALBUTEROL 90 UG/1
1 AEROSOL, METERED ORAL EVERY 6 HOURS
Qty: 0 | Refills: 0 | Status: DISCONTINUED | OUTPATIENT
Start: 2018-03-06 | End: 2018-03-06

## 2018-03-06 RX ADMIN — Medication 500 MICROGRAM(S): at 00:02

## 2018-03-06 RX ADMIN — LISINOPRIL 40 MILLIGRAM(S): 2.5 TABLET ORAL at 06:00

## 2018-03-06 RX ADMIN — Medication 1 PATCH: at 11:14

## 2018-03-06 RX ADMIN — Medication 1 MILLIGRAM(S): at 06:00

## 2018-03-06 RX ADMIN — Medication 40 MILLIGRAM(S): at 06:02

## 2018-03-06 RX ADMIN — PANTOPRAZOLE SODIUM 40 MILLIGRAM(S): 20 TABLET, DELAYED RELEASE ORAL at 06:00

## 2018-03-06 RX ADMIN — Medication 500 MICROGRAM(S): at 11:07

## 2018-03-06 RX ADMIN — Medication 1 MILLIGRAM(S): at 11:12

## 2018-03-06 RX ADMIN — Medication 500 MICROGRAM(S): at 05:21

## 2018-03-06 RX ADMIN — Medication 1 TABLET(S): at 11:12

## 2018-03-06 RX ADMIN — CARVEDILOL PHOSPHATE 25 MILLIGRAM(S): 80 CAPSULE, EXTENDED RELEASE ORAL at 06:00

## 2018-03-06 RX ADMIN — Medication 40 MILLIEQUIVALENT(S): at 11:12

## 2018-03-06 RX ADMIN — Medication 0.5 MILLIGRAM(S): at 10:47

## 2018-03-06 RX ADMIN — Medication 1 PATCH: at 11:12

## 2018-03-06 RX ADMIN — Medication 200 MILLIGRAM(S): at 08:13

## 2018-03-06 NOTE — DISCHARGE NOTE ADULT - HOSPITAL COURSE
Assessment and Plan:   · Assessment		  38yo male with pmh COPD, chronic etoh, lupus, h/o afib previously on coumadin (taken off as per pt by cardio). Presenting with etoh witdrawl seizure last night. Drinks 3pints of vodka daily, decided to stop last night. Has had seizures in the past. Currently feels better. States he has also been noticing some sob, cough and wheezing. Denies headaches, chest pain, palpitation dizziness     Still wit CIWA of 4 continue to observe        Problem/Plan - 1:  ·  Problem: Alcohol withdrawal seizure without complication.  Plan: CIWA protocol  Seizure precautions   correct hypokalemia/hypomagnesemia  SCDs due to thrombocytopenia  Thiamine/Folate/Vitamin deficiency, supplemented.      Problem/Plan - 2:  ·  Problem: COPD exacerbation.  Plan: improving switch to prednisone   ipratropium, can add albuterol when pt less tachy from withdrawl  monitor o2 sats.      Problem/Plan - 3:  ·  Problem: Tobacco abuse.  Plan: counselled  nicotine patch.      Problem/Plan - 4:  ·  Problem: Essential hypertension.  Plan: home meds.      Problem/Plan - 5:  ·  Problem: Systemic lupus erythematosus, unspecified SLE type, unspecified organ involvement status.  Plan: home meds.     Attending Attestation:   40 minutes spent on total encounter; more than 50% of the visit was spent counseling and/or coordinating care by the attending physician.

## 2018-03-06 NOTE — DISCHARGE NOTE ADULT - MEDICATION SUMMARY - MEDICATIONS TO TAKE
I will START or STAY ON the medications listed below when I get home from the hospital:    potassium  -- Indication: For Supplement     predniSONE 20 mg oral tablet  -- 2 tab(s) by mouth once a day  -- Indication: For lupus     Altace 10 mg oral capsule  -- 1 cap(s) by mouth once a day  -- Indication: For blood pressure     Zocor 20 mg oral tablet  -- 1 tab(s) by mouth once a day (at bedtime)  -- Indication: For Alcohol withdrawal seizure    Plaquenil 200 mg oral tablet  -- 1 tab(s) by mouth 2 times a day  -- Indication: For lupus     Coreg 25 mg oral tablet  -- 1 tab(s) by mouth 2 times a day  -- Indication: For blood pressure heart     Ventolin HFA CFC free 90 mcg/inh inhalation aerosol  -- 2 puff(s) inhaled 4 times a day, As Needed for wheezing  -- For inhalation only.  It is very important that you take or use this exactly as directed.  Do not skip doses or discontinue unless directed by your doctor.  Obtain medical advice before taking any non-prescription drugs as some may affect the action of this medication.  Shake well before use.    -- Indication: For Asthma     Norvasc 5 mg oral tablet  -- 1 tab(s) by mouth 2 times a day  -- Indication: For blood pressure    magnesium oxide 400 mg (241.3 mg elemental magnesium) oral tablet  -- 1 tab(s) by mouth 3 times a day (with meals)  -- Indication: For Supplement     Protonix 40 mg oral delayed release tablet  -- 1 tab(s) by mouth once a day  -- Indication: For Stomach     nicotine 7 mg/24 hr transdermal film, extended release  -- 1 patch by transdermal patch once a day   -- Indication: For Smoking     Multiple Vitamins with Minerals oral tablet  -- 1 tab(s) by mouth once a day  -- Indication: For Supplement     folic acid 1 mg oral tablet  -- 1 tab(s) by mouth once a day  -- Indication: For Supplement

## 2018-03-06 NOTE — DISCHARGE NOTE ADULT - PROVIDER TOKENS
FREE:[LAST:[PMD],FIRST:[your],PHONE:[(   )    -],FAX:[(   )    -],ADDRESS:[Patient and wife [facility] number and address]]

## 2018-03-06 NOTE — DISCHARGE NOTE ADULT - CARE PLAN
Principal Discharge DX:	Alcohol withdrawal seizure  Goal:	refrain from drinking  Assessment and plan of treatment:	please see PMD

## 2018-03-06 NOTE — DISCHARGE NOTE ADULT - PATIENT PORTAL LINK FT
You can access the [facility], offered by [facility], by registering with the following website: [email or web address]

## 2018-03-08 DIAGNOSIS — D69.6 THROMBOCYTOPENIA, UNSPECIFIED: ICD-10-CM

## 2018-03-08 DIAGNOSIS — I38 ENDOCARDITIS, VALVE UNSPECIFIED: ICD-10-CM

## 2018-03-08 DIAGNOSIS — E87.6 HYPOKALEMIA: ICD-10-CM
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
## 2018-03-08 DIAGNOSIS — Z79.51 LONG TERM (CURRENT) USE OF INHALED STEROIDS: ICD-10-CM

## 2018-03-08 DIAGNOSIS — J44.1 CHRONIC OBSTRUCTIVE PULMONARY DISEASE WITH (ACUTE) EXACERBATION: ICD-10-CM

## 2018-03-08 DIAGNOSIS — F17.200 NICOTINE DEPENDENCE, UNSPECIFIED, UNCOMPLICATED: ICD-10-CM

## 2018-03-08 DIAGNOSIS — F10.239 ALCOHOL DEPENDENCE WITH WITHDRAWAL, UNSPECIFIED: ICD-10-CM

## 2018-03-08 DIAGNOSIS — J45.909 UNSPECIFIED ASTHMA, UNCOMPLICATED: ICD-10-CM

## 2018-03-08 DIAGNOSIS — M32.9 SYSTEMIC LUPUS ERYTHEMATOSUS, UNSPECIFIED: ICD-10-CM

## 2018-03-08 DIAGNOSIS — Z88.2 ALLERGY STATUS TO SULFONAMIDES: ICD-10-CM

## 2018-03-08 DIAGNOSIS — G40.89 OTHER SEIZURES: ICD-10-CM

## 2018-03-08 DIAGNOSIS — Q60.0 RENAL AGENESIS, UNILATERAL: ICD-10-CM

## 2018-03-08 DIAGNOSIS — I48.91 UNSPECIFIED ATRIAL FIBRILLATION: ICD-10-CM

## 2018-03-08 DIAGNOSIS — E83.42 HYPOMAGNESEMIA: ICD-10-CM

## 2019-01-11 ENCOUNTER — TRANSCRIPTION ENCOUNTER (OUTPATIENT)
Age: 39
End: 2019-01-11

## 2019-01-25 ENCOUNTER — TRANSCRIPTION ENCOUNTER (OUTPATIENT)
Age: 39
End: 2019-01-25

## 2019-08-04 ENCOUNTER — TRANSCRIPTION ENCOUNTER (OUTPATIENT)
Age: 39
End: 2019-08-04

## 2019-11-05 NOTE — DISCHARGE NOTE ADULT - NSTOBACCOUSAGEY/N_GEN_A_CS
Partially impaired: cannot see medication labels or newsprint, but can see obstacles in path, and the surrounding layout; can count fingers at arm's length
Yes

## 2020-01-14 NOTE — PATIENT PROFILE ADULT. - BLOOD TRANSFUSION, PREVIOUS, PROFILE
68y M pmh unilateral nephrectomy (donation), IVC filter, HTN, HL presenting with wheezing x1.5 weeks. Saw PMD a week ago and was diagnosed with bronchitis, prescribed Z-pack. Says this has not improved his dyspnea. +pedal edema. No orthopnea. No f/c/n/v. No abdominal pain. Normal BM. No chest pain.
yes

## 2020-11-30 NOTE — ED ADULT NURSE NOTE - NS ED NOTE ABUSE SUSPICION NEGLECT YN
Called and spoke with Aristeo and Judith regarding Aristeo's [employer] message.     Typing is still off, but he is no longer running into things.   Denies signs/ symptoms of infection; cough, fever, loss of smell.   Denies headaches.   More stressed lately.     Offered trial of dexamethasone, but declined for now in favor of focusing on stress reduction and napping/ sleeping better.     Looking into clinical trial options, a referral in [place], and [facility] on compassionate use.    Aristeo and Judith will continue to keep me updated on symptoms or other concerns.     Ericka Avila MD  Neuro-oncology  11/30/2020          
No

## 2022-02-08 NOTE — H&P ADULT - NSHPSOCIALHISTORY_GEN_ALL_CORE
Detail Level: Detailed
Quality 226: Preventive Care And Screening: Tobacco Use: Screening And Cessation Intervention: Patient screened for tobacco use and is an ex/non-smoker
Quality 111:Pneumonia Vaccination Status For Older Adults: Pneumococcal Vaccination Previously Received
Quality 130: Documentation Of Current Medications In The Medical Record: Current Medications Documented
daily vodka drinker   daily smoker

## 2022-03-16 NOTE — PROGRESS NOTE ADULT - PROBLEM/PLAN-10
management per onc  [place] [facility] [place] FREE TEXT

## 2022-09-02 NOTE — PROGRESS NOTE ADULT - PROBLEM SELECTOR PLAN 6
Patient informed of sleep study results, agreeable to cpap machine. Process explained, patient had no further questions.   -Follow up Renal   -monitor electrolytes, BMP

## 2022-09-16 NOTE — ED PROVIDER NOTE - SKIN, MLM
Skin normal color for race, warm, dry and intact. No evidence of rash. Spiral Flap Text: The defect edges were debeveled with a #15 scalpel blade.  Given the location of the defect, shape of the defect and the proximity to free margins a spiral flap was deemed most appropriate.  Using a sterile surgical marker, an appropriate rotation flap was drawn incorporating the defect and placing the expected incisions within the relaxed skin tension lines where possible. The area thus outlined was incised deep to adipose tissue with a #15 scalpel blade.  The skin margins were undermined to an appropriate distance in all directions utilizing iris scissors.

## 2023-03-06 NOTE — H&P ADULT - PSH
Cataract (lens) fragments in eye following cataract surgery, left eye    History of throat surgery  stretched  History of throat surgery  cyst removed What Type Of Note Output Would You Prefer (Optional)?: Standard Output Hpi Title: Evaluation of Skin Lesions How Severe Are Your Spot(S)?: moderate Have Your Spot(S) Been Treated In The Past?: has not been treated

## 2023-07-20 NOTE — ED ADULT TRIAGE NOTE - BSA (M2)
2.29 Low Dose Naltrexone Counseling- I discussed with the patient the potential risks and side effects of low dose naltrexone including but not limited to: more vivid dreams, headaches, nausea, vomiting, abdominal pain, fatigue, dizziness, and anxiety.

## 2023-08-10 NOTE — DISCHARGE NOTE ADULT - FUNCTIONAL SCREEN CURRENT LEVEL: DRESSING, MLM
Detail Level: Detailed Detail Level: Zone (0) independent Prescription Strength Graduated Compression Stockings Recommendations: The patient was counseled that prescription strength graduated compression stockings should be worn for all waking hours. They will follow up with a venous specialist to monitor graduated compression stocking usage and their symptoms.

## 2023-10-05 NOTE — ED ADULT TRIAGE NOTE - NS ED NURSE BANDS TYPE
Vascular Access Team    POWERGLIDE MIDLINE    THIS IS NOT A CENTRAL LINE.  DO NOT ADMINISTER TPN OR VASOPRESSORS THROUGH A MIDLINE     [id number] utilized for explanation of procedure.    Indication: 8 days ertapenem    Procedure Details:  Stop sign placed on door and door closed.      A 20g X 10 cm long POWERGLIDE PRO MIDLINE was placed in the Left cephalic vein in one needle pass using ultrasound guidance.  Catheter to vein ratio = 30%.  Midline inserted using all elements of aseptic non-touch technique. Hand hygiene performed per policy and application of 2% chlorhexidine glutamate for cutaneous antisepsis.  Catheter secured at 0cm at skin.  Midline secured using StatLock, Chlorhexidine patch and sterile dressing.  Arm circumference 27cm.  Brisk blood return noted and midline flushed easily with 10 mL normal saline.  The line may be used immediately.  The bedside RNSummer was notified.  The midline is power injectable.  Per policy, all fluids, tubing, needleless connectors and disinfection caps need to be replaced. Midline sign attached to IV pole. Limb restriction band applied.    If there are any problems, including difficulty drawing labs, or questions regarding this device, please contact a Vascular Access Specialist at [phone].    Total time spent with patient:35 minutes                  
Name band;

## 2025-04-10 NOTE — PROGRESS NOTE ADULT - PROBLEM SELECTOR PROBLEM 3
PROVIDER:[TOKEN:[[id number]],FOLLOWUP:[1 week]] Hypokalemia Hypophosphatemia Chronic obstructive pulmonary disease, unspecified COPD type